# Patient Record
Sex: FEMALE | Race: ASIAN | NOT HISPANIC OR LATINO | URBAN - METROPOLITAN AREA
[De-identification: names, ages, dates, MRNs, and addresses within clinical notes are randomized per-mention and may not be internally consistent; named-entity substitution may affect disease eponyms.]

---

## 2017-10-04 ENCOUNTER — EMERGENCY (EMERGENCY)
Facility: HOSPITAL | Age: 32
LOS: 1 days | Discharge: HOME | End: 2017-10-04
Attending: EMERGENCY MEDICINE
Payer: MEDICAID

## 2017-10-04 VITALS
WEIGHT: 179.02 LBS | DIASTOLIC BLOOD PRESSURE: 76 MMHG | HEART RATE: 79 BPM | RESPIRATION RATE: 14 BRPM | SYSTOLIC BLOOD PRESSURE: 118 MMHG | HEIGHT: 66 IN | OXYGEN SATURATION: 100 % | TEMPERATURE: 97 F

## 2017-10-04 VITALS
DIASTOLIC BLOOD PRESSURE: 67 MMHG | OXYGEN SATURATION: 100 % | HEART RATE: 74 BPM | RESPIRATION RATE: 16 BRPM | TEMPERATURE: 98 F | SYSTOLIC BLOOD PRESSURE: 108 MMHG

## 2017-10-04 DIAGNOSIS — Z98.890 OTHER SPECIFIED POSTPROCEDURAL STATES: Chronic | ICD-10-CM

## 2017-10-04 LAB
ALBUMIN SERPL ELPH-MCNC: 3.8 G/DL — SIGNIFICANT CHANGE UP (ref 3.5–5)
ALP SERPL-CCNC: 73 U/L — SIGNIFICANT CHANGE UP (ref 40–120)
ALT FLD-CCNC: 18 U/L DA — SIGNIFICANT CHANGE UP (ref 10–60)
ANION GAP SERPL CALC-SCNC: 9 MMOL/L — SIGNIFICANT CHANGE UP (ref 5–17)
APPEARANCE UR: ABNORMAL
AST SERPL-CCNC: 12 U/L — SIGNIFICANT CHANGE UP (ref 10–40)
BASOPHILS # BLD AUTO: 0 K/UL — SIGNIFICANT CHANGE UP (ref 0–0.2)
BASOPHILS NFR BLD AUTO: 0.5 % — SIGNIFICANT CHANGE UP (ref 0–2)
BILIRUB SERPL-MCNC: 0.4 MG/DL — SIGNIFICANT CHANGE UP (ref 0.2–1.2)
BILIRUB UR-MCNC: NEGATIVE — SIGNIFICANT CHANGE UP
BUN SERPL-MCNC: 12 MG/DL — SIGNIFICANT CHANGE UP (ref 7–18)
CALCIUM SERPL-MCNC: 9 MG/DL — SIGNIFICANT CHANGE UP (ref 8.4–10.5)
CHLORIDE SERPL-SCNC: 105 MMOL/L — SIGNIFICANT CHANGE UP (ref 96–108)
CO2 SERPL-SCNC: 26 MMOL/L — SIGNIFICANT CHANGE UP (ref 22–31)
COLOR SPEC: YELLOW — SIGNIFICANT CHANGE UP
CREAT SERPL-MCNC: 0.78 MG/DL — SIGNIFICANT CHANGE UP (ref 0.5–1.3)
DIFF PNL FLD: ABNORMAL
EOSINOPHIL # BLD AUTO: 0.1 K/UL — SIGNIFICANT CHANGE UP (ref 0–0.5)
EOSINOPHIL NFR BLD AUTO: 0.5 % — SIGNIFICANT CHANGE UP (ref 0–6)
GLUCOSE SERPL-MCNC: 105 MG/DL — HIGH (ref 70–99)
GLUCOSE UR QL: NEGATIVE — SIGNIFICANT CHANGE UP
HCG SERPL-ACNC: <1 MIU/ML — SIGNIFICANT CHANGE UP
HCG UR QL: NEGATIVE — SIGNIFICANT CHANGE UP
HCT VFR BLD CALC: 38.8 % — SIGNIFICANT CHANGE UP (ref 34.5–45)
HGB BLD-MCNC: 11.9 G/DL — SIGNIFICANT CHANGE UP (ref 11.5–15.5)
KETONES UR-MCNC: ABNORMAL
LEUKOCYTE ESTERASE UR-ACNC: ABNORMAL
LIDOCAIN IGE QN: 135 U/L — SIGNIFICANT CHANGE UP (ref 73–393)
LYMPHOCYTES # BLD AUTO: 1.6 K/UL — SIGNIFICANT CHANGE UP (ref 1–3.3)
LYMPHOCYTES # BLD AUTO: 16.1 % — SIGNIFICANT CHANGE UP (ref 13–44)
MCHC RBC-ENTMCNC: 27.5 PG — SIGNIFICANT CHANGE UP (ref 27–34)
MCHC RBC-ENTMCNC: 30.6 GM/DL — LOW (ref 32–36)
MCV RBC AUTO: 89.9 FL — SIGNIFICANT CHANGE UP (ref 80–100)
MONOCYTES # BLD AUTO: 0.4 K/UL — SIGNIFICANT CHANGE UP (ref 0–0.9)
MONOCYTES NFR BLD AUTO: 3.9 % — SIGNIFICANT CHANGE UP (ref 2–14)
NEUTROPHILS # BLD AUTO: 8.1 K/UL — HIGH (ref 1.8–7.4)
NEUTROPHILS NFR BLD AUTO: 79 % — HIGH (ref 43–77)
NITRITE UR-MCNC: NEGATIVE — SIGNIFICANT CHANGE UP
PH UR: 6.5 — SIGNIFICANT CHANGE UP (ref 5–8)
PLATELET # BLD AUTO: 231 K/UL — SIGNIFICANT CHANGE UP (ref 150–400)
POTASSIUM SERPL-MCNC: 3.6 MMOL/L — SIGNIFICANT CHANGE UP (ref 3.5–5.3)
POTASSIUM SERPL-SCNC: 3.6 MMOL/L — SIGNIFICANT CHANGE UP (ref 3.5–5.3)
PROT SERPL-MCNC: 7.7 G/DL — SIGNIFICANT CHANGE UP (ref 6–8.3)
PROT UR-MCNC: 30 MG/DL
RBC # BLD: 4.31 M/UL — SIGNIFICANT CHANGE UP (ref 3.8–5.2)
RBC # FLD: 16 % — HIGH (ref 10.3–14.5)
SODIUM SERPL-SCNC: 140 MMOL/L — SIGNIFICANT CHANGE UP (ref 135–145)
SP GR SPEC: 1.02 — SIGNIFICANT CHANGE UP (ref 1.01–1.02)
UROBILINOGEN FLD QL: NEGATIVE — SIGNIFICANT CHANGE UP
WBC # BLD: 10.2 K/UL — SIGNIFICANT CHANGE UP (ref 3.8–10.5)
WBC # FLD AUTO: 10.2 K/UL — SIGNIFICANT CHANGE UP (ref 3.8–10.5)

## 2017-10-04 PROCEDURE — 99284 EMERGENCY DEPT VISIT MOD MDM: CPT | Mod: 25

## 2017-10-04 PROCEDURE — 81001 URINALYSIS AUTO W/SCOPE: CPT

## 2017-10-04 PROCEDURE — 83690 ASSAY OF LIPASE: CPT

## 2017-10-04 PROCEDURE — 74177 CT ABD & PELVIS W/CONTRAST: CPT

## 2017-10-04 PROCEDURE — 81025 URINE PREGNANCY TEST: CPT

## 2017-10-04 PROCEDURE — 80053 COMPREHEN METABOLIC PANEL: CPT

## 2017-10-04 PROCEDURE — 99285 EMERGENCY DEPT VISIT HI MDM: CPT

## 2017-10-04 PROCEDURE — 85027 COMPLETE CBC AUTOMATED: CPT

## 2017-10-04 PROCEDURE — 84702 CHORIONIC GONADOTROPIN TEST: CPT

## 2017-10-04 PROCEDURE — 74177 CT ABD & PELVIS W/CONTRAST: CPT | Mod: 26

## 2017-10-04 PROCEDURE — 96374 THER/PROPH/DIAG INJ IV PUSH: CPT | Mod: XU

## 2017-10-04 RX ORDER — MORPHINE SULFATE 50 MG/1
4 CAPSULE, EXTENDED RELEASE ORAL ONCE
Qty: 0 | Refills: 0 | Status: DISCONTINUED | OUTPATIENT
Start: 2017-10-04 | End: 2017-10-04

## 2017-10-04 RX ORDER — SODIUM CHLORIDE 9 MG/ML
1000 INJECTION INTRAMUSCULAR; INTRAVENOUS; SUBCUTANEOUS ONCE
Qty: 0 | Refills: 0 | Status: COMPLETED | OUTPATIENT
Start: 2017-10-04 | End: 2017-10-04

## 2017-10-04 RX ADMIN — MORPHINE SULFATE 4 MILLIGRAM(S): 50 CAPSULE, EXTENDED RELEASE ORAL at 13:02

## 2017-10-04 RX ADMIN — SODIUM CHLORIDE 1000 MILLILITER(S): 9 INJECTION INTRAMUSCULAR; INTRAVENOUS; SUBCUTANEOUS at 12:32

## 2017-10-04 RX ADMIN — MORPHINE SULFATE 4 MILLIGRAM(S): 50 CAPSULE, EXTENDED RELEASE ORAL at 12:32

## 2017-10-04 NOTE — ED PROVIDER NOTE - MEDICAL DECISION MAKING DETAILS
30 y/o F pt presents with RLQ abd pain. Plan to r/o appendicitis. Will do CT Abd/Pel, analgesia, and reassess.

## 2017-10-04 NOTE — ED PROVIDER NOTE - OBJECTIVE STATEMENT
32 y/o F pt with PMHx of R Ovarian Cyst (s/p cyst removal) and PSHx of Ovarian Cystectomy presents to ED c/o lower abd pain with associated nausea since today. Pt reports she is currently on her menstrual cycle. Pt denies vomiting, dysuria, hematuria, burning with urination, urinary frequency, or any other complaints. Pt also denies having had similar pain in the past. NKDA.

## 2017-10-07 DIAGNOSIS — R10.31 RIGHT LOWER QUADRANT PAIN: ICD-10-CM

## 2017-10-07 DIAGNOSIS — R11.0 NAUSEA: ICD-10-CM

## 2018-09-28 PROBLEM — Z00.00 ENCOUNTER FOR PREVENTIVE HEALTH EXAMINATION: Status: ACTIVE | Noted: 2018-09-28

## 2018-10-19 ENCOUNTER — APPOINTMENT (OUTPATIENT)
Dept: ANTEPARTUM | Facility: CLINIC | Age: 33
End: 2018-10-19
Payer: MEDICAID

## 2018-10-19 ENCOUNTER — ASOB RESULT (OUTPATIENT)
Age: 33
End: 2018-10-19

## 2018-10-19 PROCEDURE — 36416 COLLJ CAPILLARY BLOOD SPEC: CPT

## 2018-10-19 PROCEDURE — 76813 OB US NUCHAL MEAS 1 GEST: CPT

## 2018-10-19 PROCEDURE — 76801 OB US < 14 WKS SINGLE FETUS: CPT

## 2018-12-17 ENCOUNTER — APPOINTMENT (OUTPATIENT)
Dept: ANTEPARTUM | Facility: CLINIC | Age: 33
End: 2018-12-17

## 2019-03-09 ENCOUNTER — INPATIENT (INPATIENT)
Facility: HOSPITAL | Age: 34
LOS: 1 days | Discharge: HOME | End: 2019-03-11
Attending: OBSTETRICS & GYNECOLOGY | Admitting: OBSTETRICS & GYNECOLOGY

## 2019-03-09 VITALS — TEMPERATURE: 99 F

## 2019-03-09 DIAGNOSIS — Z98.890 OTHER SPECIFIED POSTPROCEDURAL STATES: Chronic | ICD-10-CM

## 2019-03-09 DIAGNOSIS — O26.899 OTHER SPECIFIED PREGNANCY RELATED CONDITIONS, UNSPECIFIED TRIMESTER: ICD-10-CM

## 2019-03-09 DIAGNOSIS — Z3A.00 WEEKS OF GESTATION OF PREGNANCY NOT SPECIFIED: ICD-10-CM

## 2019-03-09 LAB
ALBUMIN SERPL ELPH-MCNC: 3.7 G/DL — SIGNIFICANT CHANGE UP (ref 3.3–5)
ALP SERPL-CCNC: 124 U/L — HIGH (ref 40–120)
ALT FLD-CCNC: 15 U/L — SIGNIFICANT CHANGE UP (ref 4–33)
AMYLASE P1 CFR SERPL: 83 U/L — SIGNIFICANT CHANGE UP (ref 25–125)
ANION GAP SERPL CALC-SCNC: 11 MMO/L — SIGNIFICANT CHANGE UP (ref 7–14)
APPEARANCE UR: CLEAR — SIGNIFICANT CHANGE UP
AST SERPL-CCNC: 14 U/L — SIGNIFICANT CHANGE UP (ref 4–32)
B-OH-BUTYR SERPL-SCNC: < 0 MMOL/L — LOW (ref 0–0.4)
BACTERIA # UR AUTO: SIGNIFICANT CHANGE UP
BILIRUB SERPL-MCNC: < 0.2 MG/DL — LOW (ref 0.2–1.2)
BILIRUB UR-MCNC: NEGATIVE — SIGNIFICANT CHANGE UP
BLOOD UR QL VISUAL: NEGATIVE — SIGNIFICANT CHANGE UP
BUN SERPL-MCNC: 6 MG/DL — LOW (ref 7–23)
CALCIUM SERPL-MCNC: 9.7 MG/DL — SIGNIFICANT CHANGE UP (ref 8.4–10.5)
CHLORIDE SERPL-SCNC: 102 MMOL/L — SIGNIFICANT CHANGE UP (ref 98–107)
CO2 SERPL-SCNC: 21 MMOL/L — LOW (ref 22–31)
COLOR SPEC: SIGNIFICANT CHANGE UP
CREAT SERPL-MCNC: 0.49 MG/DL — LOW (ref 0.5–1.3)
FLECAINIDE SERPL-MCNC: POSITIVE — SIGNIFICANT CHANGE UP
GLUCOSE BLDC GLUCOMTR-MCNC: 218 MG/DL — HIGH (ref 70–99)
GLUCOSE BLDC GLUCOMTR-MCNC: 223 MG/DL — HIGH (ref 70–99)
GLUCOSE SERPL-MCNC: 241 MG/DL — HIGH (ref 70–99)
GLUCOSE UR-MCNC: >1000 — HIGH
HCT VFR BLD CALC: 37.2 % — SIGNIFICANT CHANGE UP (ref 34.5–45)
HGB BLD-MCNC: 11.6 G/DL — SIGNIFICANT CHANGE UP (ref 11.5–15.5)
HYALINE CASTS # UR AUTO: SIGNIFICANT CHANGE UP
KETONES UR-MCNC: NEGATIVE — SIGNIFICANT CHANGE UP
LEUKOCYTE ESTERASE UR-ACNC: SIGNIFICANT CHANGE UP
LIDOCAIN IGE QN: 39.4 U/L — SIGNIFICANT CHANGE UP (ref 7–60)
MCHC RBC-ENTMCNC: 28.8 PG — SIGNIFICANT CHANGE UP (ref 27–34)
MCHC RBC-ENTMCNC: 31.2 % — LOW (ref 32–36)
MCV RBC AUTO: 92.3 FL — SIGNIFICANT CHANGE UP (ref 80–100)
NITRITE UR-MCNC: NEGATIVE — SIGNIFICANT CHANGE UP
NRBC # FLD: 0 K/UL — LOW (ref 25–125)
PH UR: 6.5 — SIGNIFICANT CHANGE UP (ref 5–8)
PLATELET # BLD AUTO: 296 K/UL — SIGNIFICANT CHANGE UP (ref 150–400)
PMV BLD: 10.1 FL — SIGNIFICANT CHANGE UP (ref 7–13)
POTASSIUM SERPL-MCNC: 4 MMOL/L — SIGNIFICANT CHANGE UP (ref 3.5–5.3)
POTASSIUM SERPL-SCNC: 4 MMOL/L — SIGNIFICANT CHANGE UP (ref 3.5–5.3)
PROT SERPL-MCNC: 7.5 G/DL — SIGNIFICANT CHANGE UP (ref 6–8.3)
PROT UR-MCNC: 20 — SIGNIFICANT CHANGE UP
RBC # BLD: 4.03 M/UL — SIGNIFICANT CHANGE UP (ref 3.8–5.2)
RBC # FLD: 15.9 % — HIGH (ref 10.3–14.5)
RBC CASTS # UR COMP ASSIST: SIGNIFICANT CHANGE UP (ref 0–?)
SODIUM SERPL-SCNC: 134 MMOL/L — LOW (ref 135–145)
SP GR SPEC: 1.02 — SIGNIFICANT CHANGE UP (ref 1–1.04)
SQUAMOUS # UR AUTO: SIGNIFICANT CHANGE UP
UROBILINOGEN FLD QL: NORMAL — SIGNIFICANT CHANGE UP
WBC # BLD: 10.39 K/UL — SIGNIFICANT CHANGE UP (ref 3.8–10.5)
WBC # FLD AUTO: 10.39 K/UL — SIGNIFICANT CHANGE UP (ref 3.8–10.5)
WBC UR QL: HIGH (ref 0–?)

## 2019-03-09 RX ORDER — SODIUM CHLORIDE 9 MG/ML
3 INJECTION INTRAMUSCULAR; INTRAVENOUS; SUBCUTANEOUS EVERY 8 HOURS
Qty: 0 | Refills: 0 | Status: DISCONTINUED | OUTPATIENT
Start: 2019-03-09 | End: 2019-03-11

## 2019-03-09 RX ORDER — INSULIN LISPRO 100/ML
14 VIAL (ML) SUBCUTANEOUS
Qty: 0 | Refills: 0 | Status: DISCONTINUED | OUTPATIENT
Start: 2019-03-09 | End: 2019-03-10

## 2019-03-09 RX ORDER — INSULIN LISPRO 100/ML
10 VIAL (ML) SUBCUTANEOUS
Qty: 0 | Refills: 0 | Status: DISCONTINUED | OUTPATIENT
Start: 2019-03-09 | End: 2019-03-10

## 2019-03-09 RX ORDER — INSULIN LISPRO 100/ML
VIAL (ML) SUBCUTANEOUS
Qty: 0 | Refills: 0 | Status: DISCONTINUED | OUTPATIENT
Start: 2019-03-09 | End: 2019-03-10

## 2019-03-09 RX ORDER — DOCUSATE SODIUM 100 MG
100 CAPSULE ORAL THREE TIMES A DAY
Qty: 0 | Refills: 0 | Status: DISCONTINUED | OUTPATIENT
Start: 2019-03-09 | End: 2019-03-11

## 2019-03-09 RX ORDER — GLUCAGON INJECTION, SOLUTION 0.5 MG/.1ML
1 INJECTION, SOLUTION SUBCUTANEOUS ONCE
Qty: 0 | Refills: 0 | Status: DISCONTINUED | OUTPATIENT
Start: 2019-03-09 | End: 2019-03-11

## 2019-03-09 RX ORDER — NIFEDIPINE 30 MG
10 TABLET, EXTENDED RELEASE 24 HR ORAL
Qty: 0 | Refills: 0 | Status: COMPLETED | OUTPATIENT
Start: 2019-03-09 | End: 2019-03-10

## 2019-03-09 RX ORDER — FERROUS SULFATE 325(65) MG
325 TABLET ORAL DAILY
Qty: 0 | Refills: 0 | Status: DISCONTINUED | OUTPATIENT
Start: 2019-03-09 | End: 2019-03-11

## 2019-03-09 RX ORDER — FOLIC ACID 0.8 MG
1 TABLET ORAL DAILY
Qty: 0 | Refills: 0 | Status: DISCONTINUED | OUTPATIENT
Start: 2019-03-09 | End: 2019-03-11

## 2019-03-09 RX ORDER — DEXTROSE 50 % IN WATER 50 %
25 SYRINGE (ML) INTRAVENOUS ONCE
Qty: 0 | Refills: 0 | Status: DISCONTINUED | OUTPATIENT
Start: 2019-03-09 | End: 2019-03-11

## 2019-03-09 RX ORDER — SODIUM CHLORIDE 9 MG/ML
1000 INJECTION INTRAMUSCULAR; INTRAVENOUS; SUBCUTANEOUS
Qty: 0 | Refills: 0 | Status: DISCONTINUED | OUTPATIENT
Start: 2019-03-09 | End: 2019-03-09

## 2019-03-09 RX ORDER — DEXTROSE 50 % IN WATER 50 %
12.5 SYRINGE (ML) INTRAVENOUS ONCE
Qty: 0 | Refills: 0 | Status: DISCONTINUED | OUTPATIENT
Start: 2019-03-09 | End: 2019-03-11

## 2019-03-09 RX ORDER — INSULIN LISPRO 100/ML
VIAL (ML) SUBCUTANEOUS AT BEDTIME
Qty: 0 | Refills: 0 | Status: DISCONTINUED | OUTPATIENT
Start: 2019-03-09 | End: 2019-03-10

## 2019-03-09 RX ORDER — HUMAN INSULIN 100 [IU]/ML
34 INJECTION, SUSPENSION SUBCUTANEOUS AT BEDTIME
Qty: 0 | Refills: 0 | Status: DISCONTINUED | OUTPATIENT
Start: 2019-03-09 | End: 2019-03-10

## 2019-03-09 RX ORDER — SODIUM CHLORIDE 9 MG/ML
1000 INJECTION, SOLUTION INTRAVENOUS
Qty: 0 | Refills: 0 | Status: DISCONTINUED | OUTPATIENT
Start: 2019-03-09 | End: 2019-03-11

## 2019-03-09 RX ORDER — SODIUM CHLORIDE 9 MG/ML
1000 INJECTION INTRAMUSCULAR; INTRAVENOUS; SUBCUTANEOUS ONCE
Qty: 0 | Refills: 0 | Status: COMPLETED | OUTPATIENT
Start: 2019-03-09 | End: 2019-03-09

## 2019-03-09 RX ORDER — DIPHENHYDRAMINE HCL 50 MG
25 CAPSULE ORAL ONCE
Qty: 0 | Refills: 0 | Status: COMPLETED | OUTPATIENT
Start: 2019-03-09 | End: 2019-03-09

## 2019-03-09 RX ORDER — DEXTROSE 50 % IN WATER 50 %
15 SYRINGE (ML) INTRAVENOUS ONCE
Qty: 0 | Refills: 0 | Status: DISCONTINUED | OUTPATIENT
Start: 2019-03-09 | End: 2019-03-11

## 2019-03-09 RX ORDER — INSULIN LISPRO 100/ML
3 VIAL (ML) SUBCUTANEOUS ONCE
Qty: 0 | Refills: 0 | Status: COMPLETED | OUTPATIENT
Start: 2019-03-09 | End: 2019-03-09

## 2019-03-09 RX ORDER — DIPHENHYDRAMINE HCL 50 MG
25 CAPSULE ORAL ONCE
Qty: 0 | Refills: 0 | Status: DISCONTINUED | OUTPATIENT
Start: 2019-03-09 | End: 2019-03-09

## 2019-03-09 RX ADMIN — SODIUM CHLORIDE 2000 MILLILITER(S): 9 INJECTION INTRAMUSCULAR; INTRAVENOUS; SUBCUTANEOUS at 20:34

## 2019-03-09 RX ADMIN — SODIUM CHLORIDE 250 MILLILITER(S): 9 INJECTION INTRAMUSCULAR; INTRAVENOUS; SUBCUTANEOUS at 22:10

## 2019-03-09 RX ADMIN — Medication 3 UNIT(S): at 22:24

## 2019-03-09 RX ADMIN — Medication 25 MILLIGRAM(S): at 22:23

## 2019-03-10 VITALS
TEMPERATURE: 99 F | WEIGHT: 207.23 LBS | SYSTOLIC BLOOD PRESSURE: 87 MMHG | HEART RATE: 89 BPM | OXYGEN SATURATION: 97 % | RESPIRATION RATE: 16 BRPM | DIASTOLIC BLOOD PRESSURE: 83 MMHG | HEIGHT: 66 IN

## 2019-03-10 LAB
BLD GP AB SCN SERPL QL: NEGATIVE — SIGNIFICANT CHANGE UP
GLUCOSE BLDC GLUCOMTR-MCNC: 127 MG/DL — HIGH (ref 70–99)
GLUCOSE BLDC GLUCOMTR-MCNC: 135 MG/DL — HIGH (ref 70–99)
GLUCOSE BLDC GLUCOMTR-MCNC: 153 MG/DL — HIGH (ref 70–99)
GLUCOSE BLDC GLUCOMTR-MCNC: 154 MG/DL — HIGH (ref 70–99)
GLUCOSE BLDC GLUCOMTR-MCNC: 161 MG/DL — HIGH (ref 70–99)
GLUCOSE BLDC GLUCOMTR-MCNC: 180 MG/DL — HIGH (ref 70–99)
GLUCOSE BLDC GLUCOMTR-MCNC: 78 MG/DL — SIGNIFICANT CHANGE UP (ref 70–99)
GLUCOSE BLDC GLUCOMTR-MCNC: 90 MG/DL — SIGNIFICANT CHANGE UP (ref 70–99)
GLUCOSE BLDC GLUCOMTR-MCNC: 92 MG/DL — SIGNIFICANT CHANGE UP (ref 70–99)
HBV SURFACE AG SER-ACNC: NEGATIVE — SIGNIFICANT CHANGE UP
HIV 1+2 AB+HIV1 P24 AG SERPL QL IA: SIGNIFICANT CHANGE UP
RH IG SCN BLD-IMP: POSITIVE — SIGNIFICANT CHANGE UP
RH IG SCN BLD-IMP: POSITIVE — SIGNIFICANT CHANGE UP

## 2019-03-10 RX ORDER — INSULIN LISPRO 100/ML
3 VIAL (ML) SUBCUTANEOUS ONCE
Qty: 0 | Refills: 0 | Status: DISCONTINUED | OUTPATIENT
Start: 2019-03-10 | End: 2019-03-10

## 2019-03-10 RX ORDER — INSULIN HUMAN 100 [IU]/ML
34 INJECTION, SOLUTION SUBCUTANEOUS ONCE
Qty: 0 | Refills: 0 | Status: DISCONTINUED | OUTPATIENT
Start: 2019-03-10 | End: 2019-03-10

## 2019-03-10 RX ORDER — AMPICILLIN TRIHYDRATE 250 MG
1 CAPSULE ORAL EVERY 4 HOURS
Qty: 0 | Refills: 0 | Status: DISCONTINUED | OUTPATIENT
Start: 2019-03-10 | End: 2019-03-10

## 2019-03-10 RX ORDER — INSULIN LISPRO 100/ML
VIAL (ML) SUBCUTANEOUS AT BEDTIME
Qty: 0 | Refills: 0 | Status: DISCONTINUED | OUTPATIENT
Start: 2019-03-10 | End: 2019-03-11

## 2019-03-10 RX ORDER — AMPICILLIN TRIHYDRATE 250 MG
CAPSULE ORAL
Qty: 0 | Refills: 0 | Status: DISCONTINUED | OUTPATIENT
Start: 2019-03-10 | End: 2019-03-10

## 2019-03-10 RX ORDER — NIFEDIPINE 30 MG
10 TABLET, EXTENDED RELEASE 24 HR ORAL EVERY 6 HOURS
Qty: 0 | Refills: 0 | Status: DISCONTINUED | OUTPATIENT
Start: 2019-03-10 | End: 2019-03-11

## 2019-03-10 RX ORDER — AMPICILLIN TRIHYDRATE 250 MG
1 CAPSULE ORAL EVERY 6 HOURS
Qty: 0 | Refills: 0 | Status: DISCONTINUED | OUTPATIENT
Start: 2019-03-10 | End: 2019-03-10

## 2019-03-10 RX ORDER — INSULIN GLARGINE 100 [IU]/ML
34 INJECTION, SOLUTION SUBCUTANEOUS AT BEDTIME
Qty: 0 | Refills: 0 | Status: DISCONTINUED | OUTPATIENT
Start: 2019-03-10 | End: 2019-03-11

## 2019-03-10 RX ORDER — INSULIN LISPRO 100/ML
VIAL (ML) SUBCUTANEOUS
Qty: 0 | Refills: 0 | Status: DISCONTINUED | OUTPATIENT
Start: 2019-03-10 | End: 2019-03-11

## 2019-03-10 RX ORDER — AMPICILLIN TRIHYDRATE 250 MG
2 CAPSULE ORAL ONCE
Qty: 0 | Refills: 0 | Status: COMPLETED | OUTPATIENT
Start: 2019-03-10 | End: 2019-03-10

## 2019-03-10 RX ORDER — AMPICILLIN TRIHYDRATE 250 MG
1 CAPSULE ORAL EVERY 4 HOURS
Qty: 0 | Refills: 0 | Status: DISCONTINUED | OUTPATIENT
Start: 2019-03-10 | End: 2019-03-11

## 2019-03-10 RX ORDER — HUMAN INSULIN 100 [IU]/ML
34 INJECTION, SUSPENSION SUBCUTANEOUS ONCE
Qty: 0 | Refills: 0 | Status: DISCONTINUED | OUTPATIENT
Start: 2019-03-10 | End: 2019-03-10

## 2019-03-10 RX ORDER — INSULIN LISPRO 100/ML
12 VIAL (ML) SUBCUTANEOUS
Qty: 0 | Refills: 0 | Status: DISCONTINUED | OUTPATIENT
Start: 2019-03-10 | End: 2019-03-11

## 2019-03-10 RX ORDER — INSULIN LISPRO 100/ML
17 VIAL (ML) SUBCUTANEOUS
Qty: 0 | Refills: 0 | Status: DISCONTINUED | OUTPATIENT
Start: 2019-03-10 | End: 2019-03-11

## 2019-03-10 RX ORDER — PROGESTERONE 200 MG/1
100 CAPSULE, LIQUID FILLED ORAL AT BEDTIME
Qty: 0 | Refills: 0 | Status: DISCONTINUED | OUTPATIENT
Start: 2019-03-10 | End: 2019-03-11

## 2019-03-10 RX ORDER — INSULIN GLARGINE 100 [IU]/ML
34 INJECTION, SOLUTION SUBCUTANEOUS ONCE
Qty: 0 | Refills: 0 | Status: COMPLETED | OUTPATIENT
Start: 2019-03-10 | End: 2019-03-10

## 2019-03-10 RX ORDER — INSULIN LISPRO 100/ML
2 VIAL (ML) SUBCUTANEOUS ONCE
Qty: 0 | Refills: 0 | Status: COMPLETED | OUTPATIENT
Start: 2019-03-10 | End: 2019-03-10

## 2019-03-10 RX ORDER — DIPHENHYDRAMINE HCL 50 MG
25 CAPSULE ORAL ONCE
Qty: 0 | Refills: 0 | Status: DISCONTINUED | OUTPATIENT
Start: 2019-03-10 | End: 2019-03-11

## 2019-03-10 RX ORDER — LORATADINE 10 MG/1
10 TABLET ORAL DAILY
Qty: 0 | Refills: 0 | Status: DISCONTINUED | OUTPATIENT
Start: 2019-03-10 | End: 2019-03-11

## 2019-03-10 RX ADMIN — Medication 2 UNIT(S): at 12:00

## 2019-03-10 RX ADMIN — SODIUM CHLORIDE 3 MILLILITER(S): 9 INJECTION INTRAMUSCULAR; INTRAVENOUS; SUBCUTANEOUS at 20:00

## 2019-03-10 RX ADMIN — Medication 10 MILLIGRAM(S): at 22:23

## 2019-03-10 RX ADMIN — Medication 1 TABLET(S): at 10:08

## 2019-03-10 RX ADMIN — LORATADINE 10 MILLIGRAM(S): 10 TABLET ORAL at 09:39

## 2019-03-10 RX ADMIN — Medication 216 GRAM(S): at 01:30

## 2019-03-10 RX ADMIN — Medication 1 MILLIGRAM(S): at 10:08

## 2019-03-10 RX ADMIN — Medication 12 UNIT(S): at 16:38

## 2019-03-10 RX ADMIN — Medication 10 UNIT(S): at 10:09

## 2019-03-10 RX ADMIN — Medication 208 GRAM(S): at 10:08

## 2019-03-10 RX ADMIN — Medication 10 MILLIGRAM(S): at 16:14

## 2019-03-10 RX ADMIN — Medication 208 GRAM(S): at 14:28

## 2019-03-10 RX ADMIN — Medication 208 GRAM(S): at 05:31

## 2019-03-10 RX ADMIN — Medication 10 MILLIGRAM(S): at 10:09

## 2019-03-10 RX ADMIN — Medication 17 UNIT(S): at 21:10

## 2019-03-10 RX ADMIN — INSULIN GLARGINE 34 UNIT(S): 100 INJECTION, SOLUTION SUBCUTANEOUS at 03:36

## 2019-03-10 RX ADMIN — Medication 12 MILLIGRAM(S): at 00:52

## 2019-03-10 RX ADMIN — Medication 108 GRAM(S): at 22:26

## 2019-03-10 RX ADMIN — Medication 10 MILLIGRAM(S): at 00:57

## 2019-03-10 RX ADMIN — Medication 208 GRAM(S): at 18:20

## 2019-03-10 RX ADMIN — PROGESTERONE 100 MILLIGRAM(S): 200 CAPSULE, LIQUID FILLED ORAL at 22:14

## 2019-03-10 RX ADMIN — Medication 325 MILLIGRAM(S): at 10:08

## 2019-03-10 RX ADMIN — Medication 10 MILLIGRAM(S): at 00:36

## 2019-03-10 RX ADMIN — Medication 10 MILLIGRAM(S): at 00:09

## 2019-03-10 RX ADMIN — SODIUM CHLORIDE 3 MILLILITER(S): 9 INJECTION INTRAMUSCULAR; INTRAVENOUS; SUBCUTANEOUS at 14:29

## 2019-03-11 ENCOUNTER — ASOB RESULT (OUTPATIENT)
Age: 34
End: 2019-03-11

## 2019-03-11 ENCOUNTER — OUTPATIENT (OUTPATIENT)
Dept: OUTPATIENT SERVICES | Facility: HOSPITAL | Age: 34
LOS: 1 days | End: 2019-03-11

## 2019-03-11 ENCOUNTER — APPOINTMENT (OUTPATIENT)
Dept: ANTEPARTUM | Facility: HOSPITAL | Age: 34
End: 2019-03-11
Payer: MEDICAID

## 2019-03-11 ENCOUNTER — TRANSCRIPTION ENCOUNTER (OUTPATIENT)
Age: 34
End: 2019-03-11

## 2019-03-11 VITALS
OXYGEN SATURATION: 98 % | SYSTOLIC BLOOD PRESSURE: 102 MMHG | TEMPERATURE: 98 F | RESPIRATION RATE: 18 BRPM | DIASTOLIC BLOOD PRESSURE: 56 MMHG | HEART RATE: 89 BPM

## 2019-03-11 DIAGNOSIS — Z98.890 OTHER SPECIFIED POSTPROCEDURAL STATES: Chronic | ICD-10-CM

## 2019-03-11 LAB
GLUCOSE BLDC GLUCOMTR-MCNC: 116 MG/DL — HIGH (ref 70–99)
GLUCOSE BLDC GLUCOMTR-MCNC: 138 MG/DL — HIGH (ref 70–99)
GLUCOSE BLDC GLUCOMTR-MCNC: 144 MG/DL — HIGH (ref 70–99)
GLUCOSE BLDC GLUCOMTR-MCNC: 162 MG/DL — HIGH (ref 70–99)
GLUCOSE BLDC GLUCOMTR-MCNC: 220 MG/DL — HIGH (ref 70–99)
RUBV IGG SER-ACNC: 6.4 INDEX — SIGNIFICANT CHANGE UP
RUBV IGG SER-IMP: POSITIVE — SIGNIFICANT CHANGE UP
T PALLIDUM AB TITR SER: NEGATIVE — SIGNIFICANT CHANGE UP

## 2019-03-11 PROCEDURE — 76819 FETAL BIOPHYS PROFIL W/O NST: CPT | Mod: 26

## 2019-03-11 PROCEDURE — 76811 OB US DETAILED SNGL FETUS: CPT | Mod: 26

## 2019-03-11 RX ORDER — INSULIN GLARGINE 100 [IU]/ML
34 INJECTION, SOLUTION SUBCUTANEOUS
Qty: 0 | Refills: 0 | COMMUNITY

## 2019-03-11 RX ORDER — INSULIN LISPRO 100/ML
0 VIAL (ML) SUBCUTANEOUS
Qty: 0 | Refills: 0 | COMMUNITY

## 2019-03-11 RX ORDER — INSULIN LISPRO 100/ML
2 VIAL (ML) SUBCUTANEOUS ONCE
Qty: 0 | Refills: 0 | Status: COMPLETED | OUTPATIENT
Start: 2019-03-11 | End: 2019-03-11

## 2019-03-11 RX ORDER — INSULIN GLARGINE 100 [IU]/ML
40 INJECTION, SOLUTION SUBCUTANEOUS
Qty: 0 | Refills: 0 | COMMUNITY
Start: 2019-03-11

## 2019-03-11 RX ORDER — PROGESTERONE 200 MG/1
1 CAPSULE, LIQUID FILLED ORAL
Qty: 0 | Refills: 0 | COMMUNITY

## 2019-03-11 RX ORDER — PROGESTERONE 200 MG/1
100 CAPSULE, LIQUID FILLED ORAL
Qty: 0 | Refills: 0 | COMMUNITY
Start: 2019-03-11

## 2019-03-11 RX ORDER — INSULIN GLARGINE 100 [IU]/ML
40 INJECTION, SOLUTION SUBCUTANEOUS AT BEDTIME
Qty: 0 | Refills: 0 | Status: DISCONTINUED | OUTPATIENT
Start: 2019-03-11 | End: 2019-03-11

## 2019-03-11 RX ORDER — INSULIN LISPRO 100/ML
17 VIAL (ML) SUBCUTANEOUS
Qty: 0 | Refills: 0 | COMMUNITY

## 2019-03-11 RX ORDER — HEPARIN SODIUM 5000 [USP'U]/ML
5000 INJECTION INTRAVENOUS; SUBCUTANEOUS EVERY 12 HOURS
Qty: 0 | Refills: 0 | Status: DISCONTINUED | OUTPATIENT
Start: 2019-03-11 | End: 2019-03-11

## 2019-03-11 RX ADMIN — INSULIN GLARGINE 34 UNIT(S): 100 INJECTION, SOLUTION SUBCUTANEOUS at 00:19

## 2019-03-11 RX ADMIN — Medication 2 UNIT(S): at 10:28

## 2019-03-11 RX ADMIN — LORATADINE 10 MILLIGRAM(S): 10 TABLET ORAL at 11:45

## 2019-03-11 RX ADMIN — Medication 12 UNIT(S): at 08:19

## 2019-03-11 RX ADMIN — Medication 12 UNIT(S): at 12:40

## 2019-03-11 RX ADMIN — Medication 1 MILLIGRAM(S): at 11:45

## 2019-03-11 RX ADMIN — Medication 12 MILLIGRAM(S): at 00:57

## 2019-03-11 RX ADMIN — Medication 1: at 08:25

## 2019-03-11 RX ADMIN — Medication 325 MILLIGRAM(S): at 11:45

## 2019-03-11 RX ADMIN — SODIUM CHLORIDE 3 MILLILITER(S): 9 INJECTION INTRAMUSCULAR; INTRAVENOUS; SUBCUTANEOUS at 06:40

## 2019-03-11 RX ADMIN — Medication 1 TABLET(S): at 11:45

## 2019-03-11 NOTE — ADVANCED PRACTICE NURSE CONSULT - REASON FOR CONSULT
34y/o AMANDA 19 EGA 33.2wks . Pt. a/w back pain and cramping, with cervical length of 1.0-1.8cm. Pt stable, vital signs stable. Pt now without complaints, denies pain, contractions, vaginal bleeding, leaking fluid. Endorses +FM.  Fasting glucose this , will increased lantus dose to 40 units. S/P consult with Diabetic Nurse specialist, patient understands proper use and administration of insulin and blood glucose monitoring.   Pt referred for diabetes education because she has GDM on insulin and having hyperglycemia.

## 2019-03-11 NOTE — DISCHARGE NOTE ANTEPARTUM - CARE PROVIDER_API CALL
Fermin Estrada)  MaternalFetal Medicine; Obstetrics and Gynecology  [address] [address] [place] [address], Room [address]  [place] [address]  Phone: [phone]  Fax: [phone]  Follow Up Time:

## 2019-03-11 NOTE — DISCHARGE NOTE ANTEPARTUM - PATIENT PORTAL LINK FT
You can access the [facility] Patient Portal, offered by [facility], by registering with the following website: [email or web address]

## 2019-03-11 NOTE — DISCHARGE NOTE ANTEPARTUM - HOSPITAL COURSE
32y/o AMANDA 19 EGA 33.2wks . Pt. a/w back pain and cramping, with cervical length of 1.0-1.8cm. Pt now without complaints, denies pain, contractions, vaginal bleeding, leaking fluid. Endorses +FM.  Fasting glucose this , will increased lantus dose to 40 units.     AP: Cervical insufficiency, GDMA2 32y/o AMANDA 19 EGA 33.2wks . Pt. a/w back pain and cramping, with cervical length of 1.0-1.8cm. Pt stable, vital signs stable. Pt now without complaints, denies pain, contractions, vaginal bleeding, leaking fluid. Endorses +FM.  Fasting glucose this , will increased lantus dose to 40 units. S/P consult with Diabetic Nurse specialist, patient understands proper use and administration of insulin and blood glucose monitoring.  Pt to f/u in office this week.     AP: Cervical insufficiency, GDMA2

## 2019-03-11 NOTE — DISCHARGE NOTE ANTEPARTUM - MEDICATION SUMMARY - MEDICATIONS TO TAKE
I will START or STAY ON the medications listed below when I get home from the hospital:  None I will START or STAY ON the medications listed below when I get home from the hospital:    insulin glargine  -- 40 unit(s) subcutaneous once a day (at bedtime)  -- Indication: For Other pregnancy-related conditions, antepartum I will START or STAY ON the medications listed below when I get home from the hospital:    insulin glargine  -- 40 unit(s) subcutaneous once a day (at bedtime)  -- Indication: For Other pregnancy-related conditions, antepartum    HumaLOG 100 units/mL subcutaneous solution  -- 12 unit(s) subcutaneous once a day (before breakfast)  -- Indication: For Other pregnancy-related conditions, antepartum    HumaLOG  -- 12 unit(s) subcutaneous once a day before lunch  -- Indication: For Other pregnancy-related conditions, antepartum    HumaLOG 100 units/mL subcutaneous solution  -- subcutaneous once a day before dinner  -- Indication: For Other pregnancy-related conditions, antepartum    progesterone 100 mg vaginal insert  -- 100  vaginally once a day (at bedtime)  -- Indication: For Other pregnancy-related conditions, antepartum I will START or STAY ON the medications listed below when I get home from the hospital:    insulin glargine  -- 40 unit(s) subcutaneous once a day (at bedtime)  -- Indication: For Other pregnancy-related conditions, antepartum    HumaLOG 100 units/mL subcutaneous solution  -- 12 unit(s) subcutaneous once a day (before breakfast)  -- Indication: For Other pregnancy-related conditions, antepartum    HumaLOG  -- 12 unit(s) subcutaneous once a day before lunch  -- Indication: For Other pregnancy-related conditions, antepartum    HumaLOG 100 units/mL subcutaneous solution  -- 17 unit(s) subcutaneous once a day  -- Indication: For diabetes    progesterone 100 mg vaginal insert  -- 100  vaginally once a day (at bedtime)  -- Indication: For Other pregnancy-related conditions, antepartum

## 2019-03-11 NOTE — DISCHARGE NOTE ANTEPARTUM - CARE PLAN
Principal Discharge DX:	 labor without delivery, third trimester  Goal:	continue with prenatal care  Assessment and plan of treatment:	- continue blood glucose monitoring  -continue ADA diet  -continue insulin regimen for glucose control, increase lantus dose to 40 units at bedtime  -continue vaginal progesterone  -follow up with Dr. Estrada in office this week

## 2019-03-11 NOTE — ADVANCED PRACTICE NURSE CONSULT - RECOMMEDATIONS
Plan is to d/c pt today. Pt to f/u with MD this week. Pt was instructed to call with any problems or questions.

## 2019-03-11 NOTE — ADVANCED PRACTICE NURSE CONSULT - ASSESSMENT
Pt stopped taking her insulin for 2 days because she was becoming very itchy and developing redness on arms and legs. Patient was also starting to complain of increased cervical pressure a/w back pain and cramping. Patient was given steroids while in the hospital. Last dose of steroids was this am. Pt was instructed on the effect steroids has on blood sugars. Patient was instructed on complications that can happen if blood sugars are not controlled. Patient was instructed on diet in OB office and was able to teach back some information. Pt admitted that she did not follow diet because she was hungry. Pt instructed on GDM diet and proper snack suggestions that are culturally appropriate. Patient was able to teach back the correct way to check her blood sugar. When pt was demonstrating the correct way to use an insulin pen, it was noted that she did not perform the air shot prior to each injection, pt was giving her insulin injections mid to lower thigh, and pt was not counting for 10 seconds after insulin injection was given. Pt was instructed on proper use of insulin pen and location of injection sites and importance of rotating sites. Pt demonstrated understanding of information taught. Pt stopped taking her insulin for 2 days because she was becoming very itchy and developing redness on arms and legs. Patient was also starting to complain of increased cervical pressure a/w back pain and cramping. Patient was given steroids while in the hospital. Last dose of steroids was this am. Pt was instructed on the effect steroids has on blood sugars. Patient was instructed on complications that can happen if blood sugars are not controlled. Pt has been taking benadryl and and Claritin here and has not had any c/o itching/redness on her extremities Patient was instructed on diet in OB office and was able to teach back some information. Pt admitted that she did not follow diet because she was hungry. Pt instructed on GDM diet and proper snack suggestions that are culturally appropriate. Patient was able to teach back the correct way to check her blood sugar. When pt was demonstrating the correct way to use an insulin pen, it was noted that she did not perform the air shot prior to each injection, pt was giving her insulin injections mid to lower thigh, and pt was not counting for 10 seconds after insulin injection was given. Pt was instructed on proper use of insulin pen and location of injection sites and importance of rotating sites. Pt demonstrated understanding of information taught.

## 2019-03-11 NOTE — DISCHARGE NOTE ANTEPARTUM - PLAN OF CARE
continue with prenatal care - continue blood glucose monitoring  -continue ADA diet  -continue insulin regimen for glucose control, increase lantus dose to 40 units at bedtime  -continue vaginal progesterone  -follow up with Dr. Estrada in office this week - continue blood glucose monitoring  -continue ADA diet  -continue insulin regimen for glucose control, increase Lantus dose to 40 units at bedtime  -continue vaginal progesterone  -follow up with Dr. Estrada in office this week

## 2019-03-11 NOTE — DISCHARGE NOTE ANTEPARTUM - MEDICATION SUMMARY - MEDICATIONS TO CHANGE
I will SWITCH the dose or number of times a day I take the medications listed below when I get home from the hospital:  None I will SWITCH the dose or number of times a day I take the medications listed below when I get home from the hospital:    Lantus 100 units/mL subcutaneous solution  -- 34 unit(s) subcutaneous once a day (at bedtime)

## 2019-03-20 DIAGNOSIS — O60.03 PRETERM LABOR WITHOUT DELIVERY, THIRD TRIMESTER: ICD-10-CM

## 2019-03-20 DIAGNOSIS — O26.873 CERVICAL SHORTENING, THIRD TRIMESTER: ICD-10-CM

## 2019-03-20 DIAGNOSIS — O24.415 GESTATIONAL DIABETES MELLITUS IN PREGNANCY, CONTROLLED BY ORAL HYPOGLYCEMIC DRUGS: ICD-10-CM

## 2019-03-20 DIAGNOSIS — Z3A.33 33 WEEKS GESTATION OF PREGNANCY: ICD-10-CM

## 2019-03-28 ENCOUNTER — OUTPATIENT (OUTPATIENT)
Dept: INPATIENT UNIT | Facility: HOSPITAL | Age: 34
LOS: 1 days | Discharge: HOME | End: 2019-03-28
Payer: MEDICAID

## 2019-03-28 VITALS — OXYGEN SATURATION: 99 % | HEART RATE: 89 BPM

## 2019-03-28 DIAGNOSIS — O26.899 OTHER SPECIFIED PREGNANCY RELATED CONDITIONS, UNSPECIFIED TRIMESTER: ICD-10-CM

## 2019-03-28 DIAGNOSIS — Z3A.00 WEEKS OF GESTATION OF PREGNANCY NOT SPECIFIED: ICD-10-CM

## 2019-03-28 DIAGNOSIS — Z98.890 OTHER SPECIFIED POSTPROCEDURAL STATES: Chronic | ICD-10-CM

## 2019-03-28 LAB
ALBUMIN SERPL ELPH-MCNC: 3.2 G/DL — LOW (ref 3.3–5)
ALP SERPL-CCNC: 133 U/L — HIGH (ref 40–120)
ALT FLD-CCNC: 28 U/L — SIGNIFICANT CHANGE UP (ref 4–33)
AMYLASE P1 CFR SERPL: 104 U/L — SIGNIFICANT CHANGE UP (ref 25–125)
ANION GAP SERPL CALC-SCNC: 10 MMO/L — SIGNIFICANT CHANGE UP (ref 7–14)
APPEARANCE UR: SIGNIFICANT CHANGE UP
AST SERPL-CCNC: 25 U/L — SIGNIFICANT CHANGE UP (ref 4–32)
BACTERIA # UR AUTO: HIGH
BASOPHILS # BLD AUTO: 0.02 K/UL — SIGNIFICANT CHANGE UP (ref 0–0.2)
BASOPHILS NFR BLD AUTO: 0.3 % — SIGNIFICANT CHANGE UP (ref 0–2)
BILIRUB SERPL-MCNC: < 0.2 MG/DL — LOW (ref 0.2–1.2)
BILIRUB UR-MCNC: NEGATIVE — SIGNIFICANT CHANGE UP
BLOOD UR QL VISUAL: NEGATIVE — SIGNIFICANT CHANGE UP
BUN SERPL-MCNC: 7 MG/DL — SIGNIFICANT CHANGE UP (ref 7–23)
CALCIUM SERPL-MCNC: 8.9 MG/DL — SIGNIFICANT CHANGE UP (ref 8.4–10.5)
CHLORIDE SERPL-SCNC: 105 MMOL/L — SIGNIFICANT CHANGE UP (ref 98–107)
CO2 SERPL-SCNC: 21 MMOL/L — LOW (ref 22–31)
COLOR SPEC: COLORLESS — SIGNIFICANT CHANGE UP
CREAT SERPL-MCNC: 0.49 MG/DL — LOW (ref 0.5–1.3)
EOSINOPHIL # BLD AUTO: 0.1 K/UL — SIGNIFICANT CHANGE UP (ref 0–0.5)
EOSINOPHIL NFR BLD AUTO: 1.4 % — SIGNIFICANT CHANGE UP (ref 0–6)
GLUCOSE SERPL-MCNC: 137 MG/DL — HIGH (ref 70–99)
GLUCOSE UR-MCNC: 150 — HIGH
HCT VFR BLD CALC: 38.7 % — SIGNIFICANT CHANGE UP (ref 34.5–45)
HGB BLD-MCNC: 11.5 G/DL — SIGNIFICANT CHANGE UP (ref 11.5–15.5)
HYALINE CASTS # UR AUTO: SIGNIFICANT CHANGE UP
IMM GRANULOCYTES NFR BLD AUTO: 0.4 % — SIGNIFICANT CHANGE UP (ref 0–1.5)
KETONES UR-MCNC: NEGATIVE — SIGNIFICANT CHANGE UP
LEUKOCYTE ESTERASE UR-ACNC: SIGNIFICANT CHANGE UP
LIDOCAIN IGE QN: 43.5 U/L — SIGNIFICANT CHANGE UP (ref 7–60)
LYMPHOCYTES # BLD AUTO: 1.45 K/UL — SIGNIFICANT CHANGE UP (ref 1–3.3)
LYMPHOCYTES # BLD AUTO: 19.9 % — SIGNIFICANT CHANGE UP (ref 13–44)
MCHC RBC-ENTMCNC: 28.3 PG — SIGNIFICANT CHANGE UP (ref 27–34)
MCHC RBC-ENTMCNC: 29.7 % — LOW (ref 32–36)
MCV RBC AUTO: 95.3 FL — SIGNIFICANT CHANGE UP (ref 80–100)
MONOCYTES # BLD AUTO: 0.63 K/UL — SIGNIFICANT CHANGE UP (ref 0–0.9)
MONOCYTES NFR BLD AUTO: 8.6 % — SIGNIFICANT CHANGE UP (ref 2–14)
NEUTROPHILS # BLD AUTO: 5.07 K/UL — SIGNIFICANT CHANGE UP (ref 1.8–7.4)
NEUTROPHILS NFR BLD AUTO: 69.4 % — SIGNIFICANT CHANGE UP (ref 43–77)
NITRITE UR-MCNC: NEGATIVE — SIGNIFICANT CHANGE UP
NRBC # FLD: 0 K/UL — SIGNIFICANT CHANGE UP (ref 0–0)
PH UR: 7 — SIGNIFICANT CHANGE UP (ref 5–8)
PLATELET # BLD AUTO: 235 K/UL — SIGNIFICANT CHANGE UP (ref 150–400)
PMV BLD: 9.8 FL — SIGNIFICANT CHANGE UP (ref 7–13)
POTASSIUM SERPL-MCNC: 4.3 MMOL/L — SIGNIFICANT CHANGE UP (ref 3.5–5.3)
POTASSIUM SERPL-SCNC: 4.3 MMOL/L — SIGNIFICANT CHANGE UP (ref 3.5–5.3)
PROT SERPL-MCNC: 6.7 G/DL — SIGNIFICANT CHANGE UP (ref 6–8.3)
PROT UR-MCNC: NEGATIVE — SIGNIFICANT CHANGE UP
RBC # BLD: 4.06 M/UL — SIGNIFICANT CHANGE UP (ref 3.8–5.2)
RBC # FLD: 15.2 % — HIGH (ref 10.3–14.5)
RBC CASTS # UR COMP ASSIST: SIGNIFICANT CHANGE UP (ref 0–?)
SODIUM SERPL-SCNC: 136 MMOL/L — SIGNIFICANT CHANGE UP (ref 135–145)
SP GR SPEC: 1.01 — SIGNIFICANT CHANGE UP (ref 1–1.04)
SQUAMOUS # UR AUTO: SIGNIFICANT CHANGE UP
UROBILINOGEN FLD QL: NORMAL — SIGNIFICANT CHANGE UP
WBC # BLD: 7.3 K/UL — SIGNIFICANT CHANGE UP (ref 3.8–10.5)
WBC # FLD AUTO: 7.3 K/UL — SIGNIFICANT CHANGE UP (ref 3.8–10.5)
WBC UR QL: >50 — HIGH (ref 0–?)

## 2019-03-28 PROCEDURE — 76818 FETAL BIOPHYS PROFILE W/NST: CPT | Mod: 26

## 2019-03-28 PROCEDURE — 59025 FETAL NON-STRESS TEST: CPT | Mod: 26

## 2019-03-28 PROCEDURE — 99214 OFFICE O/P EST MOD 30 MIN: CPT | Mod: 25

## 2019-03-28 RX ORDER — SODIUM CHLORIDE 9 MG/ML
3 INJECTION INTRAMUSCULAR; INTRAVENOUS; SUBCUTANEOUS EVERY 8 HOURS
Qty: 0 | Refills: 0 | Status: DISCONTINUED | OUTPATIENT
Start: 2019-03-28 | End: 2019-04-12

## 2019-03-28 RX ORDER — DIPHENHYDRAMINE HCL 50 MG
50 CAPSULE ORAL ONCE
Qty: 0 | Refills: 0 | Status: COMPLETED | OUTPATIENT
Start: 2019-03-28 | End: 2019-03-28

## 2019-03-28 RX ORDER — URSODIOL 250 MG/1
1 TABLET, FILM COATED ORAL
Qty: 60 | Refills: 0 | OUTPATIENT
Start: 2019-03-28 | End: 2019-04-26

## 2019-03-28 RX ORDER — SODIUM CHLORIDE 9 MG/ML
500 INJECTION, SOLUTION INTRAVENOUS ONCE
Qty: 0 | Refills: 0 | Status: COMPLETED | OUTPATIENT
Start: 2019-03-28 | End: 2019-03-28

## 2019-03-28 RX ORDER — ACETAMINOPHEN 500 MG
975 TABLET ORAL ONCE
Qty: 0 | Refills: 0 | Status: COMPLETED | OUTPATIENT
Start: 2019-03-28 | End: 2019-03-28

## 2019-03-28 RX ADMIN — Medication 50 MILLIGRAM(S): at 05:14

## 2019-03-28 RX ADMIN — SODIUM CHLORIDE 1500 MILLILITER(S): 9 INJECTION, SOLUTION INTRAVENOUS at 05:33

## 2019-03-28 RX ADMIN — Medication 975 MILLIGRAM(S): at 05:33

## 2019-03-28 NOTE — OB PROVIDER TRIAGE NOTE - NSHPPHYSICALEXAM_GEN_ALL_CORE
abdomen soft, nontender  sse: negative pooling/nitrazine/ferning  sve: 0/0/-3/I  taus: sliup, cephalic presentation, anterior placenta, bpp 8/8, liseth 9.7  nst in progress

## 2019-03-28 NOTE — OB PROVIDER TRIAGE NOTE - ADDITIONAL INSTRUCTIONS
Please take your medication as prescribed: ursodiol 300 mg by mouth twice a day. Please drink 1-2 liters of fluid per day. Please go to your next scheduled prenatal appointment.     You may take benadryl 50 mg by mouth as needed

## 2019-03-28 NOTE — OB RN TRIAGE NOTE - CHIEF COMPLAINT QUOTE
"I think I broke my water at 330AM" and I am having constant cramping in my abdomen "I think I broke my water at 330AM" and I am having constant cramping in my abdomen. I also having itching on and off for 2-3 weeks. "I think I broke my water at 330AM/clear" and I am having constant cramping in my abdomen. I also having itching on and off for 2-3 weeks.

## 2019-03-28 NOTE — OB PROVIDER TRIAGE NOTE - NSOBPROVIDERNOTE_OBGYN_ALL_OB_FT
33 y.o.  AMANDA 19 @ 35.4 weeks presents with c/o constant abdominal pain, LOF since 330a and generalized itching x 3 weeks. Pt denies VB. States +FM. Antepartum course complicated by GDMA2-insulin.    allergies:  NKDA  medications:  prenatal vitamins  ademalog 17 units AC  basaglar 48 units HS - last dose 11pm 3/27/19    vs     abdomen soft, nontender  sse: negative pooling/nitrazine/ferning  sve: 0/0/-3/I  taus: sliup, cephalic presentation, anterior placenta, bpp 8/8, liseth 9.7  nst in progress 33 y.o.  AMANDA 19 @ 35.4 weeks presents with c/o constant abdominal pain, LOF since 330a and generalized itching x 3 weeks. Pt denies VB. States +FM. Antepartum course complicated by GDMA2-insulin.    allergies:  NKDA  medications:  prenatal vitamins  ademalog 17 units AC  basaglar 48 units HS - last dose 11pm 3/27/19    vs 101/63 HR      abdomen soft, nontender  sse: negative pooling/nitrazine/ferning  sve: 0/0/-3/I  taus: sliup, cephalic presentation, anterior placenta, bpp 8/8, liseth 9.7  nst in progress 33 y.o.  AMANDA 19 @ 35.4 weeks presents with c/o constant abdominal pain, LOF since 330a and generalized itching x 3 weeks. Pt denies VB. States +FM. Antepartum course complicated by GDMA2-insulin.    allergies:  NKDA  medications:  prenatal vitamins  ademalog 17 units AC  basaglar 48 units HS - last dose 11pm 3/27/19    vs 101/63 HR 86     abdomen soft, nontender  sse: negative pooling/nitrazine/ferning  sve: 0/0/-3/I  taus: sliup, cephalic presentation, anterior placenta, bpp 8/8, liseth 9.7  nst in progress    ua, cbc, cmp, bile acids, amylase/lipase pending   mL IVPB x 1  benadryl 50 mg IVP x 1 33 y.o.  AMANDA 19 @ 35.4 weeks presents with c/o constant abdominal pain, LOF since 330a and generalized itching x 3 weeks. Pt denies VB. States +FM. Antepartum course complicated by GDMA2-insulin.    allergies:  NKDA  medications:  prenatal vitamins  ademalog 17 units AC  basaglar 48 units HS - last dose 11pm 3/27/19    vs 101/63 HR 86     abdomen soft, nontender  sse: negative pooling/nitrazine/ferning  sve: 0/0/-3/I  taus: sliup, cephalic presentation, anterior placenta, bpp 8/8, liseth 9.7  nst in progress    ua, cbc, cmp, bile acids, amylase/lipase pending   mL IVPB x 1  benadryl 50 mg IVP x 1  tylenol 975 mg PO x 1 33 y.o.  AMANDA 19 @ 35.4 weeks presents with c/o constant abdominal pain, LOF since 330a and generalized itching x 3 weeks. Pt denies VB. States +FM. Antepartum course complicated by GDMA2-insulin.    allergies:  NKDA  medications:  prenatal vitamins  ademalog 17 units AC  basaglar 48 units HS - last dose 11pm 3/27/19    vs 101/63 HR 86     abdomen soft, nontender  sse: negative pooling/nitrazine/ferning  sve: 0/0/-3/I  taus: sliup, cephalic presentation, anterior placenta, bpp 8/8, liseth 9.7  nst in progress    ua, cbc, cmp, bile acids, amylase/lipase pending   mL IVPB x 1  benadryl 50 mg IVP x 1  tylenol 975 mg PO x 1    0716    7.3 > 11.5/38.7 < 235    ast/alt 25/28    UA  large leukocyte esterase   0-2 RBC  > 50 WBC    ucx pending      Discussed with Dr Estrada. Pt discharged home with RX for ursidiol 300 mg PO BID. Encouraged increased PO hydration. F/U next scheduled prenatal appointment.  labor precautions/fetal kick counts reviewed.

## 2019-03-29 LAB
BACTERIA UR CULT: SIGNIFICANT CHANGE UP
SPECIMEN SOURCE: SIGNIFICANT CHANGE UP

## 2019-03-30 LAB — BILE AC FLD-MCNC: 6.2 UMOL/L — SIGNIFICANT CHANGE UP (ref 4.7–24.5)

## 2019-04-05 ENCOUNTER — OUTPATIENT (OUTPATIENT)
Dept: INPATIENT UNIT | Facility: HOSPITAL | Age: 34
LOS: 1 days | Discharge: HOME | End: 2019-04-05

## 2019-04-05 ENCOUNTER — TRANSCRIPTION ENCOUNTER (OUTPATIENT)
Age: 34
End: 2019-04-05

## 2019-04-05 VITALS
TEMPERATURE: 98 F | DIASTOLIC BLOOD PRESSURE: 69 MMHG | SYSTOLIC BLOOD PRESSURE: 117 MMHG | HEART RATE: 93 BPM | RESPIRATION RATE: 20 BRPM

## 2019-04-05 VITALS — DIASTOLIC BLOOD PRESSURE: 62 MMHG | HEART RATE: 97 BPM | SYSTOLIC BLOOD PRESSURE: 112 MMHG

## 2019-04-05 DIAGNOSIS — Z3A.00 WEEKS OF GESTATION OF PREGNANCY NOT SPECIFIED: ICD-10-CM

## 2019-04-05 DIAGNOSIS — Z98.890 OTHER SPECIFIED POSTPROCEDURAL STATES: Chronic | ICD-10-CM

## 2019-04-05 DIAGNOSIS — O26.899 OTHER SPECIFIED PREGNANCY RELATED CONDITIONS, UNSPECIFIED TRIMESTER: ICD-10-CM

## 2019-04-05 LAB — GLUCOSE BLDC GLUCOMTR-MCNC: 83 MG/DL — SIGNIFICANT CHANGE UP (ref 70–99)

## 2019-04-05 RX ORDER — INSULIN LISPRO 100/ML
12 VIAL (ML) SUBCUTANEOUS
Qty: 0 | Refills: 0 | COMMUNITY

## 2019-04-05 RX ORDER — INSULIN LISPRO 100/ML
17 VIAL (ML) SUBCUTANEOUS
Qty: 0 | Refills: 0 | COMMUNITY

## 2019-04-05 NOTE — OB PROVIDER TRIAGE NOTE - HISTORY OF PRESENT ILLNESS
33 year old female P0 at 36.6 wks (EDC 4/27/19 ) who presents with contrxs stronger since 2p stated  q 5 min  denied any rom lof vb feels gfm    Pt is GDMA2 on insulin  stated fs well controlled  with insulin   stated   EFW 5.5# as of 3/11/19   GBS  done 4/2  results unknown

## 2019-04-05 NOTE — OB PROVIDER TRIAGE NOTE - NSHPPHYSICALEXAM_GEN_ALL_CORE
pt seen and examined    abd soft gravid nt    ve 4/70/-2 vtx    scan vtx    NST reactive with contrxs q4-8 min

## 2019-04-05 NOTE — OB PROVIDER TRIAGE NOTE - NSOBPROVIDERNOTE_OBGYN_ALL_OB_FT
33 year old P0 at 36/6 wks  early labor    case d/w Dr Estrada    discharge to ambulate    s/s of ptl reviewed

## 2019-04-05 NOTE — OB RN TRIAGE NOTE - PMH
Insulin controlled gestational diabetes mellitus (GDM) in third trimester    Ovarian cyst, right Insulin controlled gestational diabetes mellitus (GDM) in third trimester

## 2019-04-06 ENCOUNTER — TRANSCRIPTION ENCOUNTER (OUTPATIENT)
Age: 34
End: 2019-04-06

## 2019-04-06 ENCOUNTER — INPATIENT (INPATIENT)
Facility: HOSPITAL | Age: 34
LOS: 3 days | Discharge: HOME | End: 2019-04-10
Attending: OBSTETRICS & GYNECOLOGY | Admitting: OBSTETRICS & GYNECOLOGY
Payer: MEDICAID

## 2019-04-06 VITALS
DIASTOLIC BLOOD PRESSURE: 68 MMHG | TEMPERATURE: 98 F | SYSTOLIC BLOOD PRESSURE: 126 MMHG | HEART RATE: 111 BPM | RESPIRATION RATE: 17 BRPM

## 2019-04-06 DIAGNOSIS — O26.899 OTHER SPECIFIED PREGNANCY RELATED CONDITIONS, UNSPECIFIED TRIMESTER: ICD-10-CM

## 2019-04-06 DIAGNOSIS — Z98.890 OTHER SPECIFIED POSTPROCEDURAL STATES: Chronic | ICD-10-CM

## 2019-04-06 DIAGNOSIS — Z3A.00 WEEKS OF GESTATION OF PREGNANCY NOT SPECIFIED: ICD-10-CM

## 2019-04-06 LAB
BASOPHILS # BLD AUTO: 0.02 K/UL — SIGNIFICANT CHANGE UP (ref 0–0.2)
BASOPHILS NFR BLD AUTO: 0.2 % — SIGNIFICANT CHANGE UP (ref 0–2)
BLD GP AB SCN SERPL QL: NEGATIVE — SIGNIFICANT CHANGE UP
EOSINOPHIL # BLD AUTO: 0.07 K/UL — SIGNIFICANT CHANGE UP (ref 0–0.5)
EOSINOPHIL NFR BLD AUTO: 0.7 % — SIGNIFICANT CHANGE UP (ref 0–6)
GLUCOSE BLDC GLUCOMTR-MCNC: 104 MG/DL — HIGH (ref 70–99)
GLUCOSE BLDC GLUCOMTR-MCNC: 113 MG/DL — HIGH (ref 70–99)
GLUCOSE BLDC GLUCOMTR-MCNC: 113 MG/DL — HIGH (ref 70–99)
GLUCOSE BLDC GLUCOMTR-MCNC: 115 MG/DL — HIGH (ref 70–99)
GLUCOSE BLDC GLUCOMTR-MCNC: 116 MG/DL — HIGH (ref 70–99)
GLUCOSE BLDC GLUCOMTR-MCNC: 122 MG/DL — HIGH (ref 70–99)
GLUCOSE BLDC GLUCOMTR-MCNC: 124 MG/DL — HIGH (ref 70–99)
GLUCOSE BLDC GLUCOMTR-MCNC: 124 MG/DL — HIGH (ref 70–99)
GLUCOSE BLDC GLUCOMTR-MCNC: 129 MG/DL — HIGH (ref 70–99)
GLUCOSE BLDC GLUCOMTR-MCNC: 130 MG/DL — HIGH (ref 70–99)
GLUCOSE BLDC GLUCOMTR-MCNC: 131 MG/DL — HIGH (ref 70–99)
GLUCOSE BLDC GLUCOMTR-MCNC: 135 MG/DL — HIGH (ref 70–99)
GLUCOSE BLDC GLUCOMTR-MCNC: 138 MG/DL — HIGH (ref 70–99)
GLUCOSE BLDC GLUCOMTR-MCNC: 142 MG/DL — HIGH (ref 70–99)
HCT VFR BLD CALC: 38.9 % — SIGNIFICANT CHANGE UP (ref 34.5–45)
HGB BLD-MCNC: 12.1 G/DL — SIGNIFICANT CHANGE UP (ref 11.5–15.5)
IMM GRANULOCYTES NFR BLD AUTO: 0.3 % — SIGNIFICANT CHANGE UP (ref 0–1.5)
LYMPHOCYTES # BLD AUTO: 1.49 K/UL — SIGNIFICANT CHANGE UP (ref 1–3.3)
LYMPHOCYTES # BLD AUTO: 13.9 % — SIGNIFICANT CHANGE UP (ref 13–44)
MCHC RBC-ENTMCNC: 28.6 PG — SIGNIFICANT CHANGE UP (ref 27–34)
MCHC RBC-ENTMCNC: 31.1 % — LOW (ref 32–36)
MCV RBC AUTO: 92 FL — SIGNIFICANT CHANGE UP (ref 80–100)
MONOCYTES # BLD AUTO: 0.73 K/UL — SIGNIFICANT CHANGE UP (ref 0–0.9)
MONOCYTES NFR BLD AUTO: 6.8 % — SIGNIFICANT CHANGE UP (ref 2–14)
NEUTROPHILS # BLD AUTO: 8.39 K/UL — HIGH (ref 1.8–7.4)
NEUTROPHILS NFR BLD AUTO: 78.1 % — HIGH (ref 43–77)
NRBC # FLD: 0 K/UL — SIGNIFICANT CHANGE UP (ref 0–0)
PLATELET # BLD AUTO: 265 K/UL — SIGNIFICANT CHANGE UP (ref 150–400)
PMV BLD: 10.1 FL — SIGNIFICANT CHANGE UP (ref 7–13)
RBC # BLD: 4.23 M/UL — SIGNIFICANT CHANGE UP (ref 3.8–5.2)
RBC # FLD: 14.8 % — HIGH (ref 10.3–14.5)
RH IG SCN BLD-IMP: POSITIVE — SIGNIFICANT CHANGE UP
RH IG SCN BLD-IMP: POSITIVE — SIGNIFICANT CHANGE UP
WBC # BLD: 10.73 K/UL — HIGH (ref 3.8–10.5)
WBC # FLD AUTO: 10.73 K/UL — HIGH (ref 3.8–10.5)

## 2019-04-06 PROCEDURE — 59514 CESAREAN DELIVERY ONLY: CPT | Mod: U7

## 2019-04-06 RX ORDER — HYDROMORPHONE HYDROCHLORIDE 2 MG/ML
1 INJECTION INTRAMUSCULAR; INTRAVENOUS; SUBCUTANEOUS ONCE
Qty: 0 | Refills: 0 | Status: DISCONTINUED | OUTPATIENT
Start: 2019-04-06 | End: 2019-04-07

## 2019-04-06 RX ORDER — MORPHINE SULFATE 50 MG/1
4 CAPSULE, EXTENDED RELEASE ORAL ONCE
Qty: 0 | Refills: 0 | Status: DISCONTINUED | OUTPATIENT
Start: 2019-04-06 | End: 2019-04-06

## 2019-04-06 RX ORDER — DIPHENHYDRAMINE HCL 50 MG
25 CAPSULE ORAL EVERY 6 HOURS
Qty: 0 | Refills: 0 | Status: DISCONTINUED | OUTPATIENT
Start: 2019-04-06 | End: 2019-04-10

## 2019-04-06 RX ORDER — SODIUM CHLORIDE 9 MG/ML
1000 INJECTION, SOLUTION INTRAVENOUS
Qty: 0 | Refills: 0 | Status: DISCONTINUED | OUTPATIENT
Start: 2019-04-06 | End: 2019-04-06

## 2019-04-06 RX ORDER — INSULIN HUMAN 100 [IU]/ML
1 INJECTION, SOLUTION SUBCUTANEOUS
Qty: 100 | Refills: 0 | Status: DISCONTINUED | OUTPATIENT
Start: 2019-04-06 | End: 2019-04-06

## 2019-04-06 RX ORDER — CITRIC ACID/SODIUM CITRATE 300-500 MG
15 SOLUTION, ORAL ORAL EVERY 4 HOURS
Qty: 0 | Refills: 0 | Status: DISCONTINUED | OUTPATIENT
Start: 2019-04-06 | End: 2019-04-06

## 2019-04-06 RX ORDER — SODIUM CHLORIDE 9 MG/ML
1000 INJECTION, SOLUTION INTRAVENOUS
Qty: 0 | Refills: 0 | Status: DISCONTINUED | OUTPATIENT
Start: 2019-04-06 | End: 2019-04-07

## 2019-04-06 RX ORDER — AMPICILLIN TRIHYDRATE 250 MG
2 CAPSULE ORAL ONCE
Qty: 0 | Refills: 0 | Status: COMPLETED | OUTPATIENT
Start: 2019-04-06 | End: 2019-04-06

## 2019-04-06 RX ORDER — HEPARIN SODIUM 5000 [USP'U]/ML
5000 INJECTION INTRAVENOUS; SUBCUTANEOUS EVERY 12 HOURS
Qty: 0 | Refills: 0 | Status: DISCONTINUED | OUTPATIENT
Start: 2019-04-06 | End: 2019-04-10

## 2019-04-06 RX ORDER — OXYTOCIN 10 UNIT/ML
2 VIAL (ML) INJECTION
Qty: 30 | Refills: 0 | Status: DISCONTINUED | OUTPATIENT
Start: 2019-04-06 | End: 2019-04-06

## 2019-04-06 RX ORDER — OXYTOCIN 10 UNIT/ML
41.67 VIAL (ML) INJECTION
Qty: 20 | Refills: 0 | Status: DISCONTINUED | OUTPATIENT
Start: 2019-04-06 | End: 2019-04-07

## 2019-04-06 RX ORDER — SIMETHICONE 80 MG/1
80 TABLET, CHEWABLE ORAL EVERY 4 HOURS
Qty: 0 | Refills: 0 | Status: DISCONTINUED | OUTPATIENT
Start: 2019-04-06 | End: 2019-04-10

## 2019-04-06 RX ORDER — OXYCODONE HYDROCHLORIDE 5 MG/1
5 TABLET ORAL
Qty: 0 | Refills: 0 | Status: COMPLETED | OUTPATIENT
Start: 2019-04-06 | End: 2019-04-13

## 2019-04-06 RX ORDER — OXYCODONE HYDROCHLORIDE 5 MG/1
10 TABLET ORAL
Qty: 0 | Refills: 0 | Status: DISCONTINUED | OUTPATIENT
Start: 2019-04-06 | End: 2019-04-07

## 2019-04-06 RX ORDER — OXYTOCIN 10 UNIT/ML
333.33 VIAL (ML) INJECTION
Qty: 20 | Refills: 0 | Status: COMPLETED | OUTPATIENT
Start: 2019-04-06 | End: 2019-04-06

## 2019-04-06 RX ORDER — OXYCODONE HYDROCHLORIDE 5 MG/1
5 TABLET ORAL EVERY 4 HOURS
Qty: 0 | Refills: 0 | Status: COMPLETED | OUTPATIENT
Start: 2019-04-06 | End: 2019-04-13

## 2019-04-06 RX ORDER — OXYCODONE HYDROCHLORIDE 5 MG/1
5 TABLET ORAL
Qty: 0 | Refills: 0 | Status: DISCONTINUED | OUTPATIENT
Start: 2019-04-06 | End: 2019-04-07

## 2019-04-06 RX ORDER — FERROUS SULFATE 325(65) MG
325 TABLET ORAL DAILY
Qty: 0 | Refills: 0 | Status: DISCONTINUED | OUTPATIENT
Start: 2019-04-06 | End: 2019-04-10

## 2019-04-06 RX ORDER — ONDANSETRON 8 MG/1
4 TABLET, FILM COATED ORAL EVERY 6 HOURS
Qty: 0 | Refills: 0 | Status: DISCONTINUED | OUTPATIENT
Start: 2019-04-06 | End: 2019-04-07

## 2019-04-06 RX ORDER — IBUPROFEN 200 MG
600 TABLET ORAL EVERY 6 HOURS
Qty: 0 | Refills: 0 | Status: DISCONTINUED | OUTPATIENT
Start: 2019-04-06 | End: 2019-04-08

## 2019-04-06 RX ORDER — OXYTOCIN 10 UNIT/ML
333.33 VIAL (ML) INJECTION
Qty: 20 | Refills: 0 | Status: DISCONTINUED | OUTPATIENT
Start: 2019-04-06 | End: 2019-04-07

## 2019-04-06 RX ORDER — HEPARIN SODIUM 5000 [USP'U]/ML
5000 INJECTION INTRAVENOUS; SUBCUTANEOUS EVERY 12 HOURS
Qty: 0 | Refills: 0 | Status: DISCONTINUED | OUTPATIENT
Start: 2019-04-06 | End: 2019-04-06

## 2019-04-06 RX ORDER — AMPICILLIN TRIHYDRATE 250 MG
1 CAPSULE ORAL EVERY 4 HOURS
Qty: 0 | Refills: 0 | Status: DISCONTINUED | OUTPATIENT
Start: 2019-04-06 | End: 2019-04-06

## 2019-04-06 RX ORDER — ACETAMINOPHEN 500 MG
975 TABLET ORAL EVERY 6 HOURS
Qty: 0 | Refills: 0 | Status: DISCONTINUED | OUTPATIENT
Start: 2019-04-06 | End: 2019-04-10

## 2019-04-06 RX ORDER — SODIUM CHLORIDE 9 MG/ML
1000 INJECTION, SOLUTION INTRAVENOUS ONCE
Qty: 0 | Refills: 0 | Status: DISCONTINUED | OUTPATIENT
Start: 2019-04-06 | End: 2019-04-06

## 2019-04-06 RX ORDER — DOCUSATE SODIUM 100 MG
100 CAPSULE ORAL
Qty: 0 | Refills: 0 | Status: DISCONTINUED | OUTPATIENT
Start: 2019-04-06 | End: 2019-04-10

## 2019-04-06 RX ORDER — OXYTOCIN 10 UNIT/ML
41.67 VIAL (ML) INJECTION
Qty: 20 | Refills: 0 | Status: DISCONTINUED | OUTPATIENT
Start: 2019-04-06 | End: 2019-04-06

## 2019-04-06 RX ORDER — NALOXONE HYDROCHLORIDE 4 MG/.1ML
0.1 SPRAY NASAL
Qty: 0 | Refills: 0 | Status: DISCONTINUED | OUTPATIENT
Start: 2019-04-06 | End: 2019-04-07

## 2019-04-06 RX ORDER — OXYTOCIN 10 UNIT/ML
333.33 VIAL (ML) INJECTION
Qty: 20 | Refills: 0 | Status: COMPLETED | OUTPATIENT
Start: 2019-04-06

## 2019-04-06 RX ORDER — TETANUS TOXOID, REDUCED DIPHTHERIA TOXOID AND ACELLULAR PERTUSSIS VACCINE, ADSORBED 5; 2.5; 8; 8; 2.5 [IU]/.5ML; [IU]/.5ML; UG/.5ML; UG/.5ML; UG/.5ML
0.5 SUSPENSION INTRAMUSCULAR ONCE
Qty: 0 | Refills: 0 | Status: DISCONTINUED | OUTPATIENT
Start: 2019-04-06 | End: 2019-04-10

## 2019-04-06 RX ORDER — OXYTOCIN 10 UNIT/ML
6 VIAL (ML) INJECTION
Qty: 30 | Refills: 0 | Status: DISCONTINUED | OUTPATIENT
Start: 2019-04-06 | End: 2019-04-06

## 2019-04-06 RX ORDER — LANOLIN
1 OINTMENT (GRAM) TOPICAL
Qty: 0 | Refills: 0 | Status: DISCONTINUED | OUTPATIENT
Start: 2019-04-06 | End: 2019-04-10

## 2019-04-06 RX ORDER — AMPICILLIN TRIHYDRATE 250 MG
CAPSULE ORAL
Qty: 0 | Refills: 0 | Status: DISCONTINUED | OUTPATIENT
Start: 2019-04-06 | End: 2019-04-06

## 2019-04-06 RX ORDER — BUTORPHANOL TARTRATE 2 MG/ML
0.12 INJECTION, SOLUTION INTRAMUSCULAR; INTRAVENOUS EVERY 6 HOURS
Qty: 0 | Refills: 0 | Status: DISCONTINUED | OUTPATIENT
Start: 2019-04-06 | End: 2019-04-07

## 2019-04-06 RX ORDER — GLYCERIN ADULT
1 SUPPOSITORY, RECTAL RECTAL AT BEDTIME
Qty: 0 | Refills: 0 | Status: DISCONTINUED | OUTPATIENT
Start: 2019-04-06 | End: 2019-04-10

## 2019-04-06 RX ADMIN — Medication 208 GRAM(S): at 15:06

## 2019-04-06 RX ADMIN — SODIUM CHLORIDE 125 MILLILITER(S): 9 INJECTION, SOLUTION INTRAVENOUS at 21:02

## 2019-04-06 RX ADMIN — MORPHINE SULFATE 4 MILLIGRAM(S): 50 CAPSULE, EXTENDED RELEASE ORAL at 08:14

## 2019-04-06 RX ADMIN — MORPHINE SULFATE 4 MILLIGRAM(S): 50 CAPSULE, EXTENDED RELEASE ORAL at 08:31

## 2019-04-06 RX ADMIN — Medication 1000 MILLIUNIT(S)/MIN: at 21:02

## 2019-04-06 RX ADMIN — SODIUM CHLORIDE 75 MILLILITER(S): 9 INJECTION, SOLUTION INTRAVENOUS at 21:27

## 2019-04-06 RX ADMIN — Medication 2 MILLIUNIT(S)/MIN: at 08:29

## 2019-04-06 RX ADMIN — Medication 216 GRAM(S): at 07:06

## 2019-04-06 RX ADMIN — Medication 208 GRAM(S): at 11:00

## 2019-04-06 RX ADMIN — Medication 125 MILLIUNIT(S)/MIN: at 21:02

## 2019-04-06 RX ADMIN — MORPHINE SULFATE 4 MILLIGRAM(S): 50 CAPSULE, EXTENDED RELEASE ORAL at 08:15

## 2019-04-06 RX ADMIN — INSULIN HUMAN 1 UNIT(S)/HR: 100 INJECTION, SOLUTION SUBCUTANEOUS at 04:09

## 2019-04-06 NOTE — CHART NOTE - NSCHARTNOTEFT_GEN_A_CORE
PA Labor Note    Patient seen & examined for cont of management. Denies need for pain intervention.     VS  T(C): 36.6 (04-06-19 @ 06:01)  HR: 95 (04-06-19 @ 07:20)  BP: 118/81 (04-06-19 @ 07:20)  RR: 18 (04-06-19 @ 02:33)  SpO2: 99% (04-06-19 @ 07:16)\    130/mod luis e/+accels/no decels  [place] q 4-5min  4.5/80/-2 AROM clear as per Dr Carreon    cont efm/toco  amp for +GBS  pitocin for augmentation of labor  dw Dr Carreon  ldevito pa

## 2019-04-06 NOTE — OB PROVIDER TRIAGE NOTE - HISTORY OF PRESENT ILLNESS
33 year old female P0 at 36.6 wks (EDC 4/27/19 ) who presents with contrxs stronger since 2p stated  q 5 min  denied any rom lof vb feels gfm  pt was seen earlier and returns from ambulation feels more contrxs and back pains    Pt is GDMA2 on insulin  stated fs well controlled  with insulin   stated   EFW 5.5# as of 3/11/19   GBS  done 4/2  results unknown

## 2019-04-06 NOTE — CHART NOTE - NSCHARTNOTEFT_GEN_A_CORE
PA Note    Patient seen & examined for cont of management. Comfortable with epidural     VS  T(C): 36.5 (19 @ 13:37)  HR: 86 (19 @ 14:21)  BP: 116/65 (19 @ 14:17)  RR: 18 (19 @ 02:33)  SpO2: 98% (19 @ 14:16)    145/mod luis e/+accels/no decels  [place] q 3-4min  9/100/-1    cont efm/toco  cont pitocin   anticipated  dw DR Velma antonio

## 2019-04-06 NOTE — OB NEONATOLOGY/PEDIATRICIAN DELIVERY SUMMARY - NSPEDSNEONOTESA_OBGYN_ALL_OB_FT
Baby girl Idalia born by CS for arrest of descent to a 34 yo  O+ blood type mother. Maternal hx of GDM A2 on insulin. No prenatal hx. PNL neg/NR/imm. GBS+ on , treated with Amp x4. AROM was with clear fluid at 07:15 on day of delivery, TOB 20:18. Baby emerged crying and vigorous, was w/d/s/s, APGARs 9/9. Slight varus deformity of L foot noted, otherwise benign exam. Mother of child would like to breastfeed and wants the baby to receive Hep B prior to discharge. EOS 0.09.

## 2019-04-06 NOTE — DISCHARGE NOTE OB - CARE PROVIDER_API CALL
Fermin Estrada)  MaternalFetal Medicine; Obstetrics and Gynecology  [address] [address] [place] [address], Room [address]  [place] [address]  Phone: [phone]  Fax: [phone]  Follow Up Time: Fermin Estrada)  MaternalFetal Medicine; Obstetrics and Gynecology  [address] [place] [address], Room [address]  [place] [address]  Phone: [phone]  Fax: [phone]  Follow Up Time:     nayla parker  Phone: [phone]  Fax: (   )    -  Follow Up Time:

## 2019-04-06 NOTE — DISCHARGE NOTE OB - PROVIDER TOKENS
PROVIDER:[TOKEN:[[id number]]] PROVIDER:[TOKEN:[[id number]]],FREE:[LAST:[keith],FIRST:[nayla],PHONE:[phone]],FAX:[(   )    -]]

## 2019-04-06 NOTE — DISCHARGE NOTE OB - PATIENT PORTAL LINK FT
You can access the [facility] Patient Portal, offered by [facility], by registering with the following website: [email or web address]

## 2019-04-06 NOTE — DISCHARGE NOTE OB - MEDICATION SUMMARY - MEDICATIONS TO STOP TAKING
I will STOP taking the medications listed below when I get home from the hospital:    Basaglar KwikPen 100 units/mL subcutaneous solution  -- 48 international unit(s) subcutaneous once a day (at bedtime)    Admelog 100 units/mL injectable solution  -- 12 international unit(s) injectable 2 times a day(break fast and lunch)    Admelog 100 units/mL injectable solution  -- 17  injectable IU once a day(bedtime)    citronatal  -- 2 cap(s) enteral once a day

## 2019-04-06 NOTE — OB RN DELIVERY SUMMARY - NS_LABORCHARACTER_OBGYN_ALL_OB
Induction of labor-AROM/Internal electronic FM/Antibiotics in labor/External electronic FM/Induction of labor-Medicinal/Augmentation of labor

## 2019-04-06 NOTE — OB PROVIDER TRIAGE NOTE - NSHPLABSRESULTS_GEN_ALL_CORE
scan vtx    NST reactive with contrxs q2-4 min   FS on adm 130  pt did eat and did not take insulin    repeat FS

## 2019-04-06 NOTE — OB PROVIDER DELIVERY SUMMARY - NSPROVIDERDELIVERYNOTE_OBGYN_ALL_OB_FT
b/l grossly normal tubes and ovaries, normal uterus  cervical extension, repaired  fibrillar x2 used over hysterotomy  good hemostasis obtained b/l grossly normal tubes and ovaries, normal uterus  cervical extension, repaired  hemabate and methergine x1 administered upon hysterotomy closure   fibrillar x2 used over hysterotomy  good hemostasis obtained b/l grossly normal tubes and ovaries, normal uterus  cervical extension, repaired  hemabate and methergine x1 administered upon hysterotomy closure   fibrillar x2 used over hysterotomy  good hemostasis obtained    [id number]- dictation number

## 2019-04-06 NOTE — OB PROVIDER H&P - NSHPPHYSICALEXAM_GEN_ALL_CORE
pt seen and examined    abd soft gravid nt    ve 5/70/-2 vtx    scan vtx    NST reactive with contrxs q4-8 min

## 2019-04-06 NOTE — OB PROVIDER H&P - ASSESSMENT
37 wks gestation  labor   GBs unknown  pcn coverage    GDMA2  FS elevated for insulin drip    epidural prn    case d/w Dr winslow

## 2019-04-06 NOTE — OB RN DELIVERY SUMMARY - NS_FORCEPSATTEMPT_OBGYN_ALL_OB
Assessment    1  Well child visit (V20 2) (Z00 129)   2  Encounter for hearing evaluation (V72 19) (Z01 10)   3  Encounter for vision screening (V72 0) (Z01 00)   4  Family history of Benign hypertension : Grandmother   5  Family history of diabetes mellitus (V18 0) (Z83 3) : Grandmother    Plan  Encounter for hearing evaluation    · SCREEN AUDIOGRAM- POC; Status:Active - Perform Order; Requested for:[id number];   Encounter for hearing evaluation, Encounter for vision screening    · SNELLEN VISION- POC; Status:Active - Perform Order; Requested for:[id number]; Health Maintenance    · Follow-up visit in 1 year Evaluation and Treatment  Follow-up  Status: Hold For -  Scheduling  Requested for: [id number]   · Brush your child's teeth after every meal and before bedtime ; Status:Complete;   Done:  [id number]   · Good hand washing is one of the best ways to control the spread of germs ;  Status:Complete;   Done: 32CHF0858   · Have your child begin routine exercise and active play ; Status:Complete;   Done:  59YIR5005   · Keep your child away from cigarette smoke ; Status:Complete;   Done: 49GDP4716   · Protect your child's skin from the effects of the sun ; Status:Complete;   Done: 16BAE6354   · We recommend routine visits to a dentist ; Status:Complete;   Done: 82TXL2064   · We recommend you offer your child a diet that is low in fat and rich in fruits and  vegetables  Avoid high intake of sweetened beverages like soda and fruit juices  We  encourage you to eat meals and scheduled snacks as a family   Offer your child new  foods regularly but do not force him or her to eat specific foods ; Status:Complete;   Done:  86BNQ6276   · Call [phone] if: You are concerned about your child's behavior at home or at  school ; Status:Complete;   Done: 70PKU7172   · Call [phone] if: You are concerned about your child's development ;  Status:Complete;   Done: 09OMN8898   · Call [phone] if: Your daughter shows signs of more pubertal development ;  Status:Complete;   Done: 90Agl3164    Discussion/Summary    Impression:   No growth, development, elimination, feeding, skin and sleep concerns  no medical problems  Anticipatory guidance addressed as per the history of present illness section  No vaccines needed  He is not on any medications  Information discussed with patient and mother  Chief Complaint  Patient presents to office for a health maintenance exam       History of Present Illness  HM, 9-12 years Male (Brief): Josh Jones presents today for routine health maintenance with his mother   Social and birth history reviewed  General Health: The child's health since the last visit is described as good   no illness since last visit  Dental hygiene: Good  Immunization status: Up to date   the patient has not had any significant adverse reactions to immunizations  Caregiver concerns:   Caregivers deny concerns regarding nutrition, sleep, behavior, school, development and elimination  Nutrition/Elimination:   Diet:  the child's current diet is diverse and healthy  Dietary supplements:  The patient does not use dietary supplements  Elimination:  No elimination issues are expressed  Sleep:  No sleep issues are reported  Behavior:  No behavior issues identified  The child's temperament is described as calm and happy  Health Risks:  No significant risk factors are identified  no tuberculosis risk factors  Safety elements used:   safety elements were discussed and are adequate  Childcare/School: The child stays home with siblings  Childcare is provided in the child's home  He is in grade 7th in [address] [employer]  School performance has been excellent     Sports Participation Questions:   History Questions: Cardiac History: no chest pain during exercise, no chest pressure during exercise, no chest discomfort during exercise, no prior EKG or Echo, no heart racing with exercise, no history of heart infection, no history of a heart murmur, no history of high blood pressure, no history of high cholesterol, no passing out or nearly passing out during exercise, has not passed out or nearly passed out after exercise and heart does not skip beats with exercise  Family History: no family history of death for no apparent reason, no family history of heart problems, no family history of sudden death or MI before age 48, no family history of Marfan Syndrome and no family history of asthma  Review of Systems    Constitutional: No complaints of tiredness, feels well, no fever, no chills, no recent weight gain or loss  Eyes: No complaints of eye pain, no discharge from eyes, no eyesight problems, eyes do not itch, no red or dry eyes  ENT: no complaints of nasal discharge, no earache, no loss of hearing, no hoarseness or sore throat, no nosebleeds  Cardiovascular: No complaints of chest pain, no palpitations, normal heart rate, no leg claudication or lower leg edema  Respiratory: No complaints of shortness of breath, no wheezing or cough, no dyspnea on exertion  Gastrointestinal: No complaints of abdominal pain, no nausea or vomiting, no constipation, no diarrhea or bloody stools  Genitourinary: No complaints of testicular pain, no dysuria or nocturia, no incontinence, no hesitancy, no gential lesion  Musculoskeletal: No complaints of joint stiffness or swelling, no myalgias, no limb pain or swelling  Integumentary: No complaints of skin rash, no skin lesions or wounds, no itching, no dry skin  Neurological: No complaints of headache, no numbness or tingling, no dizziness or fainting, no confusion, no convulsions, no limb weakness or difficulty walking  Psychiatric: No complaints of feeling depressed, no suicidal thoughts, no emotional problems, no anxiety, no sleep disturbances or changes in personality     Endocrine: No complaints of muscle weakness, no feelings of weakness, no erectile dysfunction, no deepening of voice, no hot flashes or proptosis  Hematologic/Lymphatic: No complaints of swollen glands, no neck swollen glands, does not bleed or bruise easily  ROS reported by the patient  Family History  Grandmother    · Family history of Benign hypertension   · Family history of diabetes mellitus (V18 0) (Z83 3)    Social History    · Never a smoker    Current Meds   1  No Reported Medications Recorded    Allergies    1  No Known Drug Allergies    2  No Known Food Allergies    Vitals   Recorded: 00TSI1091 19:15EF   Systolic 314   Diastolic 72   Heart Rate 84   Respiration 16   Height 4 ft 10 03 in   Weight 86 lb    BMI Calculated 17 95   BSA Calculated 1 27   BMI Percentile 44 %   2-20 Stature Percentile 18 %   2-20 Weight Percentile 24 %     Physical Exam    Constitutional - General appearance: No acute distress, well appearing and well nourished  Head and Face - Head and face: Normocephalic, atraumatic  Palpation of the face and sinuses: Normal, no sinus tenderness  Eyes - Conjunctiva and lids: No injection, edema or discharge  Pupils and irises: Equal, round, reactive to light bilaterally  Ophthalmoscopic examination: Optic discs sharp  Ears, Nose, Mouth, and Throat - External inspection of ears and nose: Normal without deformities or discharge  Otoscopic examination: Tympanic membranes gray, translucent with good bony landmarks and light reflex  Canals patent without erythema  Hearing: Normal  Nasal mucosa, septum, and turbinates: Normal, no edema or discharge  Lips, teeth, and gums: Normal, good dentition  Oropharynx: Moist mucosa, normal tongue and tonsils without lesions  Neck - Neck: Supple, symmetric, no masses  Thyroid: No thyromegaly  Pulmonary - Respiratory effort: Normal respiratory rate and rhythm, no increased work of breathing  Percussion of chest: Normal  Auscultation of lungs: Clear bilaterally  Cardiovascular - Palpation of heart: Normal PMI, no thrill  Auscultation of heart: Regular rate and rhythm, normal S1 and S2, no murmur  Examination of extremities for edema and/or varicosities: Normal    Chest - Chest: Normal    Abdomen - Abdomen: Normal bowel sounds, soft, non-tender, no masses  Liver and spleen: No hepatomegaly or splenomegaly  Examination for hernias: No hernias palpated  Genitourinary - Scrotal contents: Normal, no masses appreciated  Penis: Normal, no lesions  Lymphatic - Palpation of lymph nodes in neck: No anterior or posterior cervical lymphadenopathy  Palpation of lymph nodes in axillae: No lymphadenopathy  Musculoskeletal - Gait and station: Normal gait  Digits and nails: Normal without clubbing or cyanosis  Inspection/palpation of joints, bones, and muscles: Normal  Evaluation for scoliosis: No scoliosis on exam  Range of motion: Normal  Stability: No joint instability  Muscle strength/tone: Normal    Skin - Skin and subcutaneous tissue: No rash or lesions  Palpation of skin and subcutaneous tissue: Normal    Neurologic - Cranial nerves: Normal  Cortical function: Normal  Reflexes: Normal  Sensation: Normal  Coordination: Normal    Psychiatric - judgment and insight: Normal  Orientation to person, place, and time: Normal  Recent and remote memory: Normal  Mood and affect: Normal       Procedure    Procedure: Hearing Acuity Test    Indication: Routine screeing  Audiometry: Normal bilaterally  Hearing in the right ear: 20,25,40 decibals at 500 hertz, 20,25,40 decibals at 1000 hertz, 20,25,40 decibals at 2000 hertz and 20,25,40 decibals at 4000 hertz  Hearing in the left ear: 20,25,40 decibals at 500 hertz, 20,25,40 decibals at 1000 hertz, 20,25,40 decibals at 2000 hertz and 20,25,40 decibals at 4000 hertz  Procedure: Visual Acuity Test    Indication: routine screening  Inforrmation supplied by a Snellen chart     Results: 20/15 in both eyes without corrective device, 20/20 in the right eye without corrective device, 20/20 in the left eye without corrective device      Signatures   Electronically signed by : Franky Chau MD; Aug 25 2016  2:13PM EST                       (Author) Forceps were not used

## 2019-04-06 NOTE — DISCHARGE NOTE OB - CARE PROVIDERS DIRECT ADDRESSES
,[email or web address],DirectAddress_Unknown

## 2019-04-06 NOTE — DISCHARGE NOTE OB - MEDICATION SUMMARY - MEDICATIONS TO TAKE
I will START or STAY ON the medications listed below when I get home from the hospital:    acetaminophen 325 mg oral tablet  -- 3 tab(s) by mouth every 6 hours, As Needed  -- Indication: For pain    Prenatal Multivitamins with Folic Acid 1 mg oral tablet  -- 1 tab(s) by mouth once a day  -- Indication: For vit

## 2019-04-06 NOTE — PROGRESS NOTE ADULT - SUBJECTIVE AND OBJECTIVE BOX
S:  Arrest      O: FD +1  T(C): 36.4 (17:27)  HR: 87 (18:43)  BP: 115/61 (18:32)  RR: 18 (02:33)  SpO2: 94% (18:43)    No descent after pushing for >2 1/2hrs  Desires c/s and refuses to push.  Risks and options reviewed.  Questions answered. Consent signed  -

## 2019-04-06 NOTE — CHART NOTE - NSCHARTNOTEFT_GEN_A_CORE
R1 Note 04-06-19 @ 10:40    Pt evaluated for pain.    VITALS:  T(C): 36.8 (04-06-19 @ 09:30), Max: 36.9 (04-06-19 @ 00:33)  HR: 112 (04-06-19 @ 10:36) (77 - 112)  BP: 112/68 (04-06-19 @ 09:54) (109/70 - 133/86)  RR: 18 (04-06-19 @ 02:33) (17 - 20)  SpO2: 98% (04-06-19 @ 10:31) (93% - 100%)    EFM: , mod luis e, no accel, no decel  [place]: Ctx Q2-3minmin  VE: 5/100/-2      IMPRESSION:   FHR Category: 1  Additional:    PLAN:  Cytotec:  [ ] Continue PO Cyto     [ ] Continue VCyto   [ ] Stop Cytotec  Pitocin: [ ] Start Pitocin   [ ] Increase Pitocin  [  ] Continue Pitocin  [ ] Decrease Pitocin   [ ] Discontinue Pitocin  [x ]Expectant management [ ]Peanut ball [ ]Labor down  [ ]Re-evaluate  Additional: Epidural    David Wiley PGY-1

## 2019-04-07 LAB
BASOPHILS # BLD AUTO: 0.03 K/UL — SIGNIFICANT CHANGE UP (ref 0–0.2)
BASOPHILS NFR BLD AUTO: 0.2 % — SIGNIFICANT CHANGE UP (ref 0–2)
EOSINOPHIL # BLD AUTO: 0.01 K/UL — SIGNIFICANT CHANGE UP (ref 0–0.5)
EOSINOPHIL NFR BLD AUTO: 0.1 % — SIGNIFICANT CHANGE UP (ref 0–6)
GLUCOSE BLDC GLUCOMTR-MCNC: 113 MG/DL — HIGH (ref 70–99)
GLUCOSE BLDC GLUCOMTR-MCNC: 123 MG/DL — HIGH (ref 70–99)
GLUCOSE BLDC GLUCOMTR-MCNC: 79 MG/DL — SIGNIFICANT CHANGE UP (ref 70–99)
HCT VFR BLD CALC: 34.6 % — SIGNIFICANT CHANGE UP (ref 34.5–45)
HGB BLD-MCNC: 10.6 G/DL — LOW (ref 11.5–15.5)
IMM GRANULOCYTES NFR BLD AUTO: 0.4 % — SIGNIFICANT CHANGE UP (ref 0–1.5)
LYMPHOCYTES # BLD AUTO: 1.29 K/UL — SIGNIFICANT CHANGE UP (ref 1–3.3)
LYMPHOCYTES # BLD AUTO: 9.3 % — LOW (ref 13–44)
MCHC RBC-ENTMCNC: 28.4 PG — SIGNIFICANT CHANGE UP (ref 27–34)
MCHC RBC-ENTMCNC: 30.6 % — LOW (ref 32–36)
MCV RBC AUTO: 92.8 FL — SIGNIFICANT CHANGE UP (ref 80–100)
MONOCYTES # BLD AUTO: 0.92 K/UL — HIGH (ref 0–0.9)
MONOCYTES NFR BLD AUTO: 6.6 % — SIGNIFICANT CHANGE UP (ref 2–14)
NEUTROPHILS # BLD AUTO: 11.6 K/UL — HIGH (ref 1.8–7.4)
NEUTROPHILS NFR BLD AUTO: 83.4 % — HIGH (ref 43–77)
NRBC # FLD: 0 K/UL — SIGNIFICANT CHANGE UP (ref 0–0)
PLATELET # BLD AUTO: 228 K/UL — SIGNIFICANT CHANGE UP (ref 150–400)
PMV BLD: 10.4 FL — SIGNIFICANT CHANGE UP (ref 7–13)
RBC # BLD: 3.73 M/UL — LOW (ref 3.8–5.2)
RBC # FLD: 15 % — HIGH (ref 10.3–14.5)
T PALLIDUM AB TITR SER: NEGATIVE — SIGNIFICANT CHANGE UP
WBC # BLD: 13.91 K/UL — HIGH (ref 3.8–10.5)
WBC # FLD AUTO: 13.91 K/UL — HIGH (ref 3.8–10.5)

## 2019-04-07 RX ORDER — OXYCODONE HYDROCHLORIDE 5 MG/1
5 TABLET ORAL
Qty: 0 | Refills: 0 | Status: DISCONTINUED | OUTPATIENT
Start: 2019-04-07 | End: 2019-04-08

## 2019-04-07 RX ORDER — SODIUM CHLORIDE 9 MG/ML
3 INJECTION INTRAMUSCULAR; INTRAVENOUS; SUBCUTANEOUS EVERY 8 HOURS
Qty: 0 | Refills: 0 | Status: DISCONTINUED | OUTPATIENT
Start: 2019-04-07 | End: 2019-04-10

## 2019-04-07 RX ORDER — OXYCODONE HYDROCHLORIDE 5 MG/1
5 TABLET ORAL EVERY 4 HOURS
Qty: 0 | Refills: 0 | Status: DISCONTINUED | OUTPATIENT
Start: 2019-04-07 | End: 2019-04-10

## 2019-04-07 RX ADMIN — OXYCODONE HYDROCHLORIDE 5 MILLIGRAM(S): 5 TABLET ORAL at 10:19

## 2019-04-07 RX ADMIN — Medication 975 MILLIGRAM(S): at 01:19

## 2019-04-07 RX ADMIN — Medication 975 MILLIGRAM(S): at 19:00

## 2019-04-07 RX ADMIN — Medication 975 MILLIGRAM(S): at 06:40

## 2019-04-07 RX ADMIN — Medication 975 MILLIGRAM(S): at 06:10

## 2019-04-07 RX ADMIN — SODIUM CHLORIDE 3 MILLILITER(S): 9 INJECTION INTRAMUSCULAR; INTRAVENOUS; SUBCUTANEOUS at 14:48

## 2019-04-07 RX ADMIN — OXYCODONE HYDROCHLORIDE 5 MILLIGRAM(S): 5 TABLET ORAL at 15:30

## 2019-04-07 RX ADMIN — Medication 975 MILLIGRAM(S): at 13:30

## 2019-04-07 RX ADMIN — Medication 975 MILLIGRAM(S): at 18:00

## 2019-04-07 RX ADMIN — Medication 975 MILLIGRAM(S): at 12:30

## 2019-04-07 RX ADMIN — Medication 325 MILLIGRAM(S): at 12:30

## 2019-04-07 RX ADMIN — Medication 100 MILLIGRAM(S): at 20:45

## 2019-04-07 RX ADMIN — OXYCODONE HYDROCHLORIDE 5 MILLIGRAM(S): 5 TABLET ORAL at 19:00

## 2019-04-07 RX ADMIN — Medication 975 MILLIGRAM(S): at 23:46

## 2019-04-07 RX ADMIN — HEPARIN SODIUM 5000 UNIT(S): 5000 INJECTION INTRAVENOUS; SUBCUTANEOUS at 12:30

## 2019-04-07 RX ADMIN — SODIUM CHLORIDE 3 MILLILITER(S): 9 INJECTION INTRAMUSCULAR; INTRAVENOUS; SUBCUTANEOUS at 06:50

## 2019-04-07 RX ADMIN — Medication 100 MILLIGRAM(S): at 06:09

## 2019-04-07 RX ADMIN — OXYCODONE HYDROCHLORIDE 5 MILLIGRAM(S): 5 TABLET ORAL at 06:10

## 2019-04-07 RX ADMIN — Medication 1 TABLET(S): at 12:30

## 2019-04-07 RX ADMIN — OXYCODONE HYDROCHLORIDE 5 MILLIGRAM(S): 5 TABLET ORAL at 02:50

## 2019-04-07 RX ADMIN — HEPARIN SODIUM 5000 UNIT(S): 5000 INJECTION INTRAVENOUS; SUBCUTANEOUS at 00:49

## 2019-04-07 RX ADMIN — OXYCODONE HYDROCHLORIDE 5 MILLIGRAM(S): 5 TABLET ORAL at 18:00

## 2019-04-07 RX ADMIN — Medication 975 MILLIGRAM(S): at 00:49

## 2019-04-07 RX ADMIN — OXYCODONE HYDROCHLORIDE 5 MILLIGRAM(S): 5 TABLET ORAL at 20:45

## 2019-04-07 RX ADMIN — HEPARIN SODIUM 5000 UNIT(S): 5000 INJECTION INTRAVENOUS; SUBCUTANEOUS at 23:46

## 2019-04-07 RX ADMIN — OXYCODONE HYDROCHLORIDE 5 MILLIGRAM(S): 5 TABLET ORAL at 03:20

## 2019-04-07 RX ADMIN — OXYCODONE HYDROCHLORIDE 5 MILLIGRAM(S): 5 TABLET ORAL at 06:40

## 2019-04-07 RX ADMIN — OXYCODONE HYDROCHLORIDE 5 MILLIGRAM(S): 5 TABLET ORAL at 14:48

## 2019-04-07 RX ADMIN — OXYCODONE HYDROCHLORIDE 5 MILLIGRAM(S): 5 TABLET ORAL at 23:46

## 2019-04-07 RX ADMIN — OXYCODONE HYDROCHLORIDE 5 MILLIGRAM(S): 5 TABLET ORAL at 11:15

## 2019-04-07 NOTE — PROGRESS NOTE ADULT - PROBLEM SELECTOR PLAN 1
-Encourage Ambulation  -Continue with regular diet  -Heparin, SCDs, and ambulation for DVT ppx  -Check CBC  -Analgesia as needed    Devi Rainey MD PGY1  Pager #[phone]

## 2019-04-07 NOTE — PROGRESS NOTE ADULT - SUBJECTIVE AND OBJECTIVE BOX
Postpartum Note,  Section     33y year old woman post-operative day 1 from [facility].  No acute events overnight.  Patient has no complaints and pain is well-controlled.  Patient is tolerating regular diet, passing flatus, ambulating, is voiding spontaneously.  Postpartum bleeding is well controlled. Patient denies headache, blurry vision, epigastric pain, shortness of breath. No lightheadedness, dizziness, chest pain, or palpitations.    Physical exam:    Vital Signs Last 24 Hrs  T(C): 36.9 (2019 01:52), Max: 37.4 (2019 15:24)  T(F): 98.5 (2019 01:52), Max: 99.32 (2019 15:24)  HR: 84 (:) (76 - 120)  BP: 119/74 (:52) (94/50 - 173/119)  BP(mean): 76 (2019 22:15) (72 - 92)  RR: 20 (:52) (20 - 23)  SpO2: 99% (:52) (88% - 100%)    05 @ 07:01  -  06 @ 07:00  --------------------------------------------------------  IN: 975 mL / OUT: 0 mL / NET: 975 mL     @ 07:01  -   @ 05:40  --------------------------------------------------------  IN: 2900 mL / OUT: 2450 mL / NET: 450 mL        Gen: NAD  Abdomen: Soft, nontender, no distension, firm uterine fundus at umbilicus.  Incision: Clean, dry, and intact  Pelvic: Normal lochia noted  Ext: No calf tenderness    LABS:                        12.1   10.73 )-----------( 265      ( 2019 02:10 )             38.9                 q    acetaminophen   Tablet .. 975 milliGRAM(s) Oral every 6 hours  butorphanol Injectable 0.125 milliGRAM(s) IV Push every 6 hours PRN  diphenhydrAMINE 25 milliGRAM(s) Oral every 6 hours PRN  diphtheria/tetanus/pertussis (acellular) Vaccine (ADAcel) 0.5 milliLiter(s) IntraMuscular once  docusate sodium 100 milliGRAM(s) Oral two times a day PRN  ferrous    sulfate 325 milliGRAM(s) Oral daily  glycerin Suppository - Adult 1 Suppository(s) Rectal at bedtime PRN  heparin  Injectable 5000 Unit(s) SubCutaneous every 12 hours  HYDROmorphone  Injectable 1 milliGRAM(s) IV Push once PRN  ibuprofen  Tablet. 600 milliGRAM(s) Oral every 6 hours  lanolin Ointment 1 Application(s) Topical every 3 hours PRN  naloxone Injectable 0.1 milliGRAM(s) IV Push every 3 minutes PRN  ondansetron Injectable 4 milliGRAM(s) IV Push every 6 hours PRN  oxyCODONE    IR 5 milliGRAM(s) Oral every 3 hours PRN  oxyCODONE    IR 10 milliGRAM(s) Oral every 3 hours PRN  oxyCODONE    IR 5 milliGRAM(s) Oral every 3 hours  oxyCODONE    IR 5 milliGRAM(s) Oral every 4 hours PRN  oxytocin Infusion 41.667 milliUNIT(s)/Min IV Continuous <Continuous>  prenatal multivitamin 1 Tablet(s) Oral daily  simethicone 80 milliGRAM(s) Chew every 4 hours PRN  sodium chloride 0.9% lock flush 3 milliLiter(s) IV Push every 8 hours

## 2019-04-08 LAB
BASOPHILS # BLD AUTO: 0.03 K/UL — SIGNIFICANT CHANGE UP (ref 0–0.2)
BASOPHILS NFR BLD AUTO: 0.2 % — SIGNIFICANT CHANGE UP (ref 0–2)
EOSINOPHIL # BLD AUTO: 0.04 K/UL — SIGNIFICANT CHANGE UP (ref 0–0.5)
EOSINOPHIL NFR BLD AUTO: 0.3 % — SIGNIFICANT CHANGE UP (ref 0–6)
HCT VFR BLD CALC: 36.9 % — SIGNIFICANT CHANGE UP (ref 34.5–45)
HGB BLD-MCNC: 11.1 G/DL — LOW (ref 11.5–15.5)
IMM GRANULOCYTES NFR BLD AUTO: 0.6 % — SIGNIFICANT CHANGE UP (ref 0–1.5)
LYMPHOCYTES # BLD AUTO: 1.64 K/UL — SIGNIFICANT CHANGE UP (ref 1–3.3)
LYMPHOCYTES # BLD AUTO: 10.5 % — LOW (ref 13–44)
MCHC RBC-ENTMCNC: 28.8 PG — SIGNIFICANT CHANGE UP (ref 27–34)
MCHC RBC-ENTMCNC: 30.1 % — LOW (ref 32–36)
MCV RBC AUTO: 95.8 FL — SIGNIFICANT CHANGE UP (ref 80–100)
MONOCYTES # BLD AUTO: 0.88 K/UL — SIGNIFICANT CHANGE UP (ref 0–0.9)
MONOCYTES NFR BLD AUTO: 5.7 % — SIGNIFICANT CHANGE UP (ref 2–14)
NEUTROPHILS # BLD AUTO: 12.88 K/UL — HIGH (ref 1.8–7.4)
NEUTROPHILS NFR BLD AUTO: 82.7 % — HIGH (ref 43–77)
NRBC # FLD: 0 K/UL — SIGNIFICANT CHANGE UP (ref 0–0)
PLATELET # BLD AUTO: 247 K/UL — SIGNIFICANT CHANGE UP (ref 150–400)
PMV BLD: 9.8 FL — SIGNIFICANT CHANGE UP (ref 7–13)
RBC # BLD: 3.85 M/UL — SIGNIFICANT CHANGE UP (ref 3.8–5.2)
RBC # FLD: 14.9 % — HIGH (ref 10.3–14.5)
WBC # BLD: 15.56 K/UL — HIGH (ref 3.8–10.5)
WBC # FLD AUTO: 15.56 K/UL — HIGH (ref 3.8–10.5)

## 2019-04-08 PROCEDURE — 74177 CT ABD & PELVIS W/CONTRAST: CPT | Mod: 26

## 2019-04-08 RX ORDER — ACETAMINOPHEN 500 MG
3 TABLET ORAL
Qty: 0 | Refills: 0 | DISCHARGE
Start: 2019-04-08

## 2019-04-08 RX ORDER — INSULIN GLARGINE 100 [IU]/ML
48 INJECTION, SOLUTION SUBCUTANEOUS
Qty: 0 | Refills: 0 | COMMUNITY

## 2019-04-08 RX ORDER — INSULIN LISPRO 100/ML
17 VIAL (ML) SUBCUTANEOUS
Qty: 0 | Refills: 0 | COMMUNITY

## 2019-04-08 RX ORDER — INSULIN LISPRO 100/ML
12 VIAL (ML) SUBCUTANEOUS
Qty: 0 | Refills: 0 | COMMUNITY

## 2019-04-08 RX ORDER — SODIUM CHLORIDE 9 MG/ML
1000 INJECTION, SOLUTION INTRAVENOUS
Qty: 0 | Refills: 0 | Status: DISCONTINUED | OUTPATIENT
Start: 2019-04-08 | End: 2019-04-08

## 2019-04-08 RX ADMIN — SODIUM CHLORIDE 3 MILLILITER(S): 9 INJECTION INTRAMUSCULAR; INTRAVENOUS; SUBCUTANEOUS at 14:06

## 2019-04-08 RX ADMIN — Medication 325 MILLIGRAM(S): at 13:22

## 2019-04-08 RX ADMIN — OXYCODONE HYDROCHLORIDE 5 MILLIGRAM(S): 5 TABLET ORAL at 03:48

## 2019-04-08 RX ADMIN — SIMETHICONE 80 MILLIGRAM(S): 80 TABLET, CHEWABLE ORAL at 10:34

## 2019-04-08 RX ADMIN — Medication 975 MILLIGRAM(S): at 06:03

## 2019-04-08 RX ADMIN — OXYCODONE HYDROCHLORIDE 5 MILLIGRAM(S): 5 TABLET ORAL at 01:15

## 2019-04-08 RX ADMIN — Medication 975 MILLIGRAM(S): at 12:00

## 2019-04-08 RX ADMIN — SODIUM CHLORIDE 3 MILLILITER(S): 9 INJECTION INTRAMUSCULAR; INTRAVENOUS; SUBCUTANEOUS at 22:40

## 2019-04-08 RX ADMIN — HEPARIN SODIUM 5000 UNIT(S): 5000 INJECTION INTRAVENOUS; SUBCUTANEOUS at 13:16

## 2019-04-08 RX ADMIN — Medication 975 MILLIGRAM(S): at 18:22

## 2019-04-08 RX ADMIN — SIMETHICONE 80 MILLIGRAM(S): 80 TABLET, CHEWABLE ORAL at 06:04

## 2019-04-08 RX ADMIN — Medication 100 MILLIGRAM(S): at 10:34

## 2019-04-08 RX ADMIN — Medication 975 MILLIGRAM(S): at 11:10

## 2019-04-08 RX ADMIN — SIMETHICONE 80 MILLIGRAM(S): 80 TABLET, CHEWABLE ORAL at 01:05

## 2019-04-08 NOTE — PROGRESS NOTE ADULT - PROBLEM SELECTOR PLAN 1
- NPO, LR @125  - Increase ambulation.  - Continue motrin, tylenol, oxycodone PRN for pain control.     Juli Gupta PGY1

## 2019-04-08 NOTE — CONSULT NOTE ADULT - SUBJECTIVE AND OBJECTIVE BOX
General Surgery Consult      Consulting surgical team: A  Consulting attending: Dr. Wilder Davis       HPI: Afshan Friedman is a 33 y.o. woman with history of gestational diabetes who is POD2 from  (first delivery) wks (EDC 19 ) c/b post-operative abdominal pain. On POD1, the patient had some upper abdominal pain, which she described as "gas pain." The patient denied any associated fever, chills, nausea, or vomiting. The patient states that, overnight, she was able to walk more and continued passing flatus; this afternoon her pain is significantly improved. The patient states that she feels hungry and thirsty.       PAST MEDICAL HISTORY:  Insulin controlled gestational diabetes mellitus (GDM) in third trimester  Ovarian cyst, right  No pertinent past medical history        PAST SURGICAL HISTORY:  H/O ovarian cystectomy        MEDICATIONS:  Admelog 100 units/mL injectable solution: 12 international unit(s) injectable 2 times a day(break fast and lunch) (2019 20:23)  Admelog 100 units/mL injectable solution: 17  injectable IU once a day(bedtime) (2019 20:23)  Basaglar KwikPen 100 units/mL subcutaneous solution: 48 international unit(s) subcutaneous once a day (at bedtime) (2019 20:23)  citronatal: 2 cap(s) enteral once a day (2019 20:23)  PNV Prenatal oral tablet: 1 tab(s) orally once a day (2019 20:23)      ALLERGIES:  No Known Allergies      VITALS & I/Os:  Vital Signs Last 24 Hrs  T(C): 36.8 (2019 14:09), Max: 37.1 (2019 02:10)  T(F): 98.2 (2019 14:09), Max: 98.7 (2019 02:10)  HR: 95 (2019 14:09) (92 - 106)  BP: 95/62 (2019 14:09) (95/62 - 123/68)  BP(mean): --  RR: 18 (2019 14:09) (18 - 18)  SpO2: 98% (2019 14:09) (97% - 99%)      I&O's Summary  2019 07:01  -  2019 07:00  --------------------------------------------------------  IN: 0 mL / OUT: 850 mL / NET: -850 mL      PHYSICAL EXAM:  General: No acute distress  Respiratory: Nonlabored  Cardiovascular: borderline hypotension, regular rate  Abdominal: Soft, nondistended, mild frank-incisional tenderness, NO RLQ tenderness. No rebound or guarding. No organomegaly, no palpable mass.  Extremities: Warm      LABS:                        11.1   15.56 )-----------( 247      ( 2019 01:40 )             36.9       IMAGING:  CT Abdomen and Pelvis w/ Oral Cont and w/ IV Cont (19 @ 05:39)   LOWER CHEST: Bibasilar subsegmental atelectasis.    LIVER: Within normal limits.  BILE DUCTS: Normal caliber.  GALLBLADDER: Within normal limits.  SPLEEN: Within normal limits.  PANCREAS: Within normal limits.  ADRENALS: Within normal limits.  KIDNEYS/URETERS: Within normal limits.    BLADDER: Within normal limits.  REPRODUCTIVE ORGANS: Enlarged, heterogeneous appearance of the uterus,   with foci of air within the endometrium.    BOWEL: No bowel obstruction. Appendix is dilated to 1 cm and contains   foci of air and fluid. The appendix is thin-walled and without evidence   of adjacent periappendiceal inflammatory change.    PERITONEUM: Small pneumoperitoneum compatible with recent .   Small bladder flap hematoma.  VESSELS:  Retroaortic left renal vein.  RETROPERITONEUM: No lymphadenopathy.    ABDOMINAL WALL: Postoperative changes of the ventral abdominal wall with   foci of subcutaneous emphysema.  BONES: Within normal limits.    IMPRESSION:   Postoperative changes status postcesarean section.  Small bladder flap hematoma. General Surgery Consult      Consulting surgical team: A  Consulting attending: Dr. Diallo      HPI: Afshan Friedman is a 33 y.o. woman with history of gestational diabetes who is POD2 from  (first delivery) wks (EDC 19 ) c/b post-operative abdominal pain. On POD1, the patient had some upper abdominal pain, which she described as "gas pain." The patient denied any associated fever, chills, nausea, or vomiting. The patient states that, overnight, she was able to walk more and continued passing flatus; this afternoon her pain is significantly improved. The patient states that she feels hungry and thirsty.       PAST MEDICAL HISTORY:  Insulin controlled gestational diabetes mellitus (GDM) in third trimester  Ovarian cyst, right  No pertinent past medical history        PAST SURGICAL HISTORY:  H/O ovarian cystectomy        MEDICATIONS:  Admelog 100 units/mL injectable solution: 12 international unit(s) injectable 2 times a day(break fast and lunch) (2019 20:23)  Admelog 100 units/mL injectable solution: 17  injectable IU once a day(bedtime) (2019 20:23)  Basaglar KwikPen 100 units/mL subcutaneous solution: 48 international unit(s) subcutaneous once a day (at bedtime) (2019 20:23)  citronatal: 2 cap(s) enteral once a day (2019 20:23)  PNV Prenatal oral tablet: 1 tab(s) orally once a day (2019 20:23)      ALLERGIES:  No Known Allergies      VITALS & I/Os:  Vital Signs Last 24 Hrs  T(C): 36.8 (2019 14:09), Max: 37.1 (2019 02:10)  T(F): 98.2 (2019 14:09), Max: 98.7 (2019 02:10)  HR: 95 (2019 14:09) (92 - 106)  BP: 95/62 (2019 14:09) (95/62 - 123/68)  BP(mean): --  RR: 18 (2019 14:09) (18 - 18)  SpO2: 98% (2019 14:09) (97% - 99%)      I&O's Summary  2019 07:01  -  2019 07:00  --------------------------------------------------------  IN: 0 mL / OUT: 850 mL / NET: -850 mL      PHYSICAL EXAM:  General: No acute distress  Respiratory: Nonlabored  Cardiovascular: borderline hypotension, regular rate  Abdominal: Soft, nondistended, mild frank-incisional tenderness, NO RLQ tenderness. No rebound or guarding. No organomegaly, no palpable mass.  Extremities: Warm      LABS:                        11.1   15.56 )-----------( 247      ( 2019 01:40 )             36.9       IMAGING:  CT Abdomen and Pelvis w/ Oral Cont and w/ IV Cont (19 @ 05:39)   LOWER CHEST: Bibasilar subsegmental atelectasis.    LIVER: Within normal limits.  BILE DUCTS: Normal caliber.  GALLBLADDER: Within normal limits.  SPLEEN: Within normal limits.  PANCREAS: Within normal limits.  ADRENALS: Within normal limits.  KIDNEYS/URETERS: Within normal limits.    BLADDER: Within normal limits.  REPRODUCTIVE ORGANS: Enlarged, heterogeneous appearance of the uterus,   with foci of air within the endometrium.    BOWEL: No bowel obstruction. Appendix is dilated to 1 cm and contains   foci of air and fluid. The appendix is thin-walled and without evidence   of adjacent periappendiceal inflammatory change.    PERITONEUM: Small pneumoperitoneum compatible with recent .   Small bladder flap hematoma.  VESSELS:  Retroaortic left renal vein.  RETROPERITONEUM: No lymphadenopathy.    ABDOMINAL WALL: Postoperative changes of the ventral abdominal wall with   foci of subcutaneous emphysema.  BONES: Within normal limits.    IMPRESSION:   Postoperative changes status postcesarean section.  Small bladder flap hematoma.

## 2019-04-08 NOTE — PROVIDER CONTACT NOTE (OTHER) - ACTION/TREATMENT ORDERED:
Juli Gupta MD notified and aware.  Encouraged patient to walk, MD will reassess the patient at bed side.
NP at bedside to assess. Ambulation strongly encouraged, STAT CBC, and Maalox ordered. Will continue to monitor.

## 2019-04-08 NOTE — PROVIDER CONTACT NOTE (OTHER) - ASSESSMENT
VSS as per flowsheet, abdomen distended, bowel sounds active. Patient c/o sharp pain in abdomen. Pain managed with PO tylenol and oxycodone. Pt states she has not passed flatus.
patient c/o abdominal pain, states she passed gas this morning and felt better but now the pain has worsened.

## 2019-04-08 NOTE — CONSULT NOTE ADULT - ASSESSMENT
Afshan Friedman is a 33 y.o. woman with history of gestational diabetes who is POD2 from  (first delivery) wks (EDC 19 ) c/b post-operative abdominal pain. On POD1, the patient had some upper abdominal pain, which she described as "gas pain." Significantly improved symptoms today.     Plan:  - Will discuss with Dr. Imani Dawson, PGY2  [id number] Afshan Friedman is a 33 y.o. woman with history of gestational diabetes who is POD2 from  (first delivery) wks (EDC 19 ) c/b post-operative abdominal pain. On POD1, the patient had some upper abdominal pain, which she described as "gas pain." Significantly improved symptoms today.     Plan:  - Given history, physical exam, and negative CT, very low suspicion for acute appendicitis  - No surgical intervention indicated  - Plan discussed with Dr. Imani Dawson, PGY2  [id number]

## 2019-04-08 NOTE — CHART NOTE - NSCHARTNOTEFT_GEN_A_CORE
OB Progress Note: LTCS, POD#2    S: 32yo POD#2 s/p LTCS. Patient continues to have abdominal pain, worsened now from this morning. She was feeling better when she was moving and passing has, but now feels unable to move. Pain is in lower abdomen.     O:  Vitals:  Vital Signs Last 24 Hrs  T(C): 36.8 (08 Apr 2019 09:58), Max: 37.1 (08 Apr 2019 02:10)  T(F): 98.3 (08 Apr 2019 09:58), Max: 98.7 (08 Apr 2019 02:10)  HR: 92 (08 Apr 2019 09:58) (92 - 106)  BP: 108/63 (08 Apr 2019 09:58) (95/57 - 123/68)  BP(mean): --  RR: 18 (08 Apr 2019 09:58) (18 - 18)  SpO2: 98% (08 Apr 2019 09:58) (97% - 99%)    MEDICATIONS  (STANDING):  acetaminophen   Tablet .. 975 milliGRAM(s) Oral every 6 hours  aluminum hydroxide/magnesium hydroxide/simethicone Suspension 30 milliLiter(s) Oral once  diphtheria/tetanus/pertussis (acellular) Vaccine (ADAcel) 0.5 milliLiter(s) IntraMuscular once  ferrous    sulfate 325 milliGRAM(s) Oral daily  heparin  Injectable 5000 Unit(s) SubCutaneous every 12 hours  ibuprofen  Tablet. 600 milliGRAM(s) Oral every 6 hours  lactated ringers. 1000 milliLiter(s) (125 mL/Hr) IV Continuous <Continuous>  oxyCODONE    IR 5 milliGRAM(s) Oral every 3 hours  prenatal multivitamin 1 Tablet(s) Oral daily  sodium chloride 0.9% lock flush 3 milliLiter(s) IV Push every 8 hours      MEDICATIONS  (PRN):  diphenhydrAMINE 25 milliGRAM(s) Oral every 6 hours PRN Itching  docusate sodium 100 milliGRAM(s) Oral two times a day PRN Stool Softening  glycerin Suppository - Adult 1 Suppository(s) Rectal at bedtime PRN Constipation  lanolin Ointment 1 Application(s) Topical every 3 hours PRN Sore Nipples  oxyCODONE    IR 5 milliGRAM(s) Oral every 4 hours PRN Severe Pain (7 - 10)  simethicone 80 milliGRAM(s) Chew every 4 hours PRN Gas      Labs:  Blood type: O Positive  Rubella IgG: Positive (03-10 @ 00:15)  RPR: Negative                          11.1<L>   15.56<H> >-----------< 247    ( 04-08 @ 01:40 )             36.9                        10.6<L>   13.91<H> >-----------< 228    ( 04-07 @ 07:10 )             34.6                        12.1   10.73<H> >-----------< 265    ( 04-06 @ 02:10 )             38.9                  PE:  General: NAD  Abdomen: Soft, TTP RLQ > LLQ, distension unchanged from this am. No rebound or guarding. Faint bowel sounds x4. Incision c/d/i.  Extremities: No erythema, no pitting edema    CT scan overnight showed postoperative changes, small bladder flap hematoma, and appendix dilated to 1cm.    A/P: 32yo POD#2 s/p LTCS with severe lower abdominal pain, R>L. Pain etiology either from dilated appendix vs gas.  - general surgery paged for consult per attending Dr. Estrada request (for Dr. Diallo)  - NPO, LR@125  - Increase ambulation.  - Continue motrin, tylenol, hold oxycodone for now    Juli Gupta PGY1  D/w Dr. Estrada and Dr. Kendall (chief resident)

## 2019-04-08 NOTE — PROGRESS NOTE ADULT - SUBJECTIVE AND OBJECTIVE BOX
OB Progress Note: LTCS, POD#2    S: 34yo POD#2 s/p LTCS. Pain is improved from overnight. She is NPO at this time. Now passing more flatus than yesterday. She is voiding spontaneously, and ambulating without difficulty. Denies CP/SOB. Denies lightheadedness/dizziness. Denies N/V.    O:  Vitals:  Vital Signs Last 24 Hrs  T(C): 36.6 (08 Apr 2019 05:39), Max: 37.1 (08 Apr 2019 02:10)  T(F): 97.8 (08 Apr 2019 05:39), Max: 98.7 (08 Apr 2019 02:10)  HR: 102 (08 Apr 2019 05:39) (98 - 106)  BP: 123/68 (08 Apr 2019 05:39) (95/57 - 123/68)  BP(mean): --  RR: 18 (08 Apr 2019 05:39) (18 - 18)  SpO2: 99% (08 Apr 2019 05:39) (97% - 99%)    MEDICATIONS  (STANDING):  acetaminophen   Tablet .. 975 milliGRAM(s) Oral every 6 hours  aluminum hydroxide/magnesium hydroxide/simethicone Suspension 30 milliLiter(s) Oral once  diphtheria/tetanus/pertussis (acellular) Vaccine (ADAcel) 0.5 milliLiter(s) IntraMuscular once  ferrous    sulfate 325 milliGRAM(s) Oral daily  heparin  Injectable 5000 Unit(s) SubCutaneous every 12 hours  ibuprofen  Tablet. 600 milliGRAM(s) Oral every 6 hours  lactated ringers. 1000 milliLiter(s) (125 mL/Hr) IV Continuous <Continuous>  oxyCODONE    IR 5 milliGRAM(s) Oral every 3 hours  prenatal multivitamin 1 Tablet(s) Oral daily  sodium chloride 0.9% lock flush 3 milliLiter(s) IV Push every 8 hours      MEDICATIONS  (PRN):  diphenhydrAMINE 25 milliGRAM(s) Oral every 6 hours PRN Itching  docusate sodium 100 milliGRAM(s) Oral two times a day PRN Stool Softening  glycerin Suppository - Adult 1 Suppository(s) Rectal at bedtime PRN Constipation  lanolin Ointment 1 Application(s) Topical every 3 hours PRN Sore Nipples  oxyCODONE    IR 5 milliGRAM(s) Oral every 4 hours PRN Severe Pain (7 - 10)  simethicone 80 milliGRAM(s) Chew every 4 hours PRN Gas      Labs:  Blood type: O Positive  Rubella IgG: Positive (03-10 @ 00:15)  RPR: Negative                          11.1<L>   15.56<H> >-----------< 247    ( 04-08 @ 01:40 )             36.9                        10.6<L>   13.91<H> >-----------< 228    ( 04-07 @ 07:10 )             34.6                        12.1   10.73<H> >-----------< 265    ( 04-06 @ 02:10 )             38.9                  PE:  General: NAD  Abdomen: Soft, appropriately tender, incision c/d/i.  Extremities: No erythema, no pitting edema

## 2019-04-08 NOTE — PROGRESS NOTE ADULT - ASSESSMENT
A/P: 32yo POD#2 s/p LTCS. Pt evaluated overnight for abdominal pain. CT showed pneumoperitoneum consistent with postop changes, mildly dilated appendix with air foci, and small bladder flap hematoma.  Patient is stable and doing well post-operatively.

## 2019-04-08 NOTE — CONSULT NOTE ADULT - ATTENDING COMMENTS
Above noted. History reviewed, the patient was examined. She is POD 2 from a C- section. Developed epigastric pain last night, that persisted until this morning and decreased markedly after she passed flatus this afternoon.  Physical Exam: Afebrile.  Abdomen: Soft, minimal tenderness, incision is clean.  Impression: Post  ileus.  Plan: Clear liquid diet.

## 2019-04-09 RX ADMIN — Medication 975 MILLIGRAM(S): at 13:15

## 2019-04-09 RX ADMIN — Medication 975 MILLIGRAM(S): at 23:57

## 2019-04-09 RX ADMIN — SIMETHICONE 80 MILLIGRAM(S): 80 TABLET, CHEWABLE ORAL at 23:58

## 2019-04-09 RX ADMIN — SIMETHICONE 80 MILLIGRAM(S): 80 TABLET, CHEWABLE ORAL at 00:52

## 2019-04-09 RX ADMIN — Medication 975 MILLIGRAM(S): at 18:25

## 2019-04-09 RX ADMIN — Medication 325 MILLIGRAM(S): at 12:16

## 2019-04-09 RX ADMIN — Medication 975 MILLIGRAM(S): at 12:16

## 2019-04-09 RX ADMIN — Medication 975 MILLIGRAM(S): at 07:30

## 2019-04-09 RX ADMIN — Medication 975 MILLIGRAM(S): at 01:51

## 2019-04-09 RX ADMIN — Medication 975 MILLIGRAM(S): at 06:29

## 2019-04-09 RX ADMIN — SIMETHICONE 80 MILLIGRAM(S): 80 TABLET, CHEWABLE ORAL at 12:16

## 2019-04-09 RX ADMIN — Medication 975 MILLIGRAM(S): at 00:51

## 2019-04-09 RX ADMIN — Medication 1 TABLET(S): at 12:16

## 2019-04-09 RX ADMIN — HEPARIN SODIUM 5000 UNIT(S): 5000 INJECTION INTRAVENOUS; SUBCUTANEOUS at 18:25

## 2019-04-09 RX ADMIN — HEPARIN SODIUM 5000 UNIT(S): 5000 INJECTION INTRAVENOUS; SUBCUTANEOUS at 00:53

## 2019-04-09 RX ADMIN — SODIUM CHLORIDE 3 MILLILITER(S): 9 INJECTION INTRAMUSCULAR; INTRAVENOUS; SUBCUTANEOUS at 06:31

## 2019-04-10 VITALS
HEART RATE: 90 BPM | SYSTOLIC BLOOD PRESSURE: 107 MMHG | OXYGEN SATURATION: 99 % | DIASTOLIC BLOOD PRESSURE: 65 MMHG | TEMPERATURE: 98 F

## 2019-04-10 RX ADMIN — Medication 975 MILLIGRAM(S): at 06:02

## 2019-04-10 RX ADMIN — Medication 975 MILLIGRAM(S): at 18:05

## 2019-04-10 RX ADMIN — HEPARIN SODIUM 5000 UNIT(S): 5000 INJECTION INTRAVENOUS; SUBCUTANEOUS at 06:03

## 2019-04-10 RX ADMIN — Medication 975 MILLIGRAM(S): at 13:14

## 2019-04-10 RX ADMIN — Medication 975 MILLIGRAM(S): at 13:25

## 2019-04-10 RX ADMIN — SIMETHICONE 80 MILLIGRAM(S): 80 TABLET, CHEWABLE ORAL at 06:03

## 2019-04-10 RX ADMIN — Medication 975 MILLIGRAM(S): at 00:28

## 2019-04-10 RX ADMIN — Medication 975 MILLIGRAM(S): at 06:34

## 2019-04-10 RX ADMIN — SODIUM CHLORIDE 3 MILLILITER(S): 9 INJECTION INTRAMUSCULAR; INTRAVENOUS; SUBCUTANEOUS at 13:25

## 2019-04-10 NOTE — PROGRESS NOTE ADULT - SUBJECTIVE AND OBJECTIVE BOX
SUBJECTIVE:    Pain: Controlled    Complaints: None    MILESTONES:    Alert and Oriented x 3  [ x ]  Out of bed/ ambulating. [ x ]  Flatus:   Positive [ x ]  Negative [  ]  Bowel movement  [ X ] Positive [  ] Negative   Voiding [x  ] Due to void [  ]   Sams/Indwelling catheter in place [  ]  Diet: Regular [ x ]  Clears [  ]  NPO [  ]    Infant feeding:  Breast [  ]   Bottle [  ]  Both [ X ]  Feeding related issues and/or concerns:      OBJECTIVE:  T(C): 36.6 (04-10-19 @ 06:07), Max: 36.6 (19 @ 16:00)  HR: 84 (04-10-19 @ 06:07) (77 - 88)  BP: 109/67 (04-10-19 @ 06:07) (100/72 - 110/67)  RR: 16 (04-10-19 @ 06:07) (16 - 16)  SpO2: 100% (04-10-19 @ 06:07) (99% - 100%)  Wt(kg): --          Blood Type: O Positive    RPR: Negative          MEDICATIONS  (STANDING):  acetaminophen   Tablet .. 975 milliGRAM(s) Oral every 6 hours  aluminum hydroxide/magnesium hydroxide/simethicone Suspension 30 milliLiter(s) Oral once  diphtheria/tetanus/pertussis (acellular) Vaccine (ADAcel) 0.5 milliLiter(s) IntraMuscular once  ferrous    sulfate 325 milliGRAM(s) Oral daily  heparin  Injectable 5000 Unit(s) SubCutaneous every 12 hours  prenatal multivitamin 1 Tablet(s) Oral daily  sodium chloride 0.9% lock flush 3 milliLiter(s) IV Push every 8 hours    MEDICATIONS  (PRN):  diphenhydrAMINE 25 milliGRAM(s) Oral every 6 hours PRN Itching  docusate sodium 100 milliGRAM(s) Oral two times a day PRN Stool Softening  glycerin Suppository - Adult 1 Suppository(s) Rectal at bedtime PRN Constipation  lanolin Ointment 1 Application(s) Topical every 3 hours PRN Sore Nipples  oxyCODONE    IR 5 milliGRAM(s) Oral every 4 hours PRN Severe Pain (7 - 10)  simethicone 80 milliGRAM(s) Chew every 4 hours PRN Gas        ASSESSMENT:    33y     G   1   P    1001     PO Day#  3        Delivery: Primary [X  ]    Repeat [  ]       EBL - 1200, 2nd to [facility], s/p Hemabate and Methergine IM                                  Indication of procedure: Arrest    Condition: Stable    Past Medical History significant for: HPI:  33 year old female P0 at 36.6 wks (EDC 19 ) who presents with contrxs stronger since 2p stated  q 5 min  denied any rom lof vb feels gfm  pt was seen earlier and returns from ambulation feels more contrxs and back pains    Pt is GDMA2 on insulin  stated fs well controlled  with insulin   stated   EFW 5.5# as of 3/11/19   GBS  done   results unknown (2019 02:09)      Current Issues:    Breasts:  Soft [x  ]   Engorged [  ]  Nipples:  Abdomen: Soft [ x ]   Distended [  ] Nontender [  ]     Bowel sounds :  Present [  ]  Absent [  ]   Fundus:  Firm [x  ]  Boggy [  ]    Abdominal incision: Clean, Dry and Intact [x  ]  Staples [  ] Steri Strips [  ] Dermabond [X  ] Sutures [  ]    Patient wearing abdominal binder for support.    Vaginal: Lochia:  Heavy [  ]  Moderate [ x ]   Scant [  ]    Extremities: Edema [ X ] Negative Vinny's Sign [ X ] Nontender Diego  [ x ] Positive pedal pulses [  ]    Other relevant physical exam findings:      PLAN:    Plan: Increase ambulation, analgesia PRN and pain medication protocol standing oxycodone, ibuprofen and acetaminophen.    Diet: Regular diet    Diabetes - For Repeat Glucose Testing in 6 Weeks.    Continue routine post-operative and postpartum care.  C/O Severe abdominal Pain on PO day # 2, R/O Appendicitis,  CTPA was done.  General Surgery - -> Most likely gas.  Has had a BM x 2 since. Feeling much better. Pain resolved.    Discharge Planning [ x ]    For discharge Today  [  X  ]    Consults:  Social Work [  ]  Lactation [ x ]  Other [         ] SUBJECTIVE:    Pain: Controlled    Complaints: None    MILESTONES:    Alert and Oriented x 3  [ x ]  Out of bed/ ambulating. [ x ]  Flatus:   Positive [ x ]  Negative [  ]  Bowel movement  [ X ] Positive [  ] Negative   Voiding [x  ] Due to void [  ]   Sams/Indwelling catheter in place [  ]  Diet: Regular [ x ]  Clears [  ]  NPO [  ]    Infant feeding:  Breast [  ]   Bottle [  ]  Both [ X ]  Feeding related issues and/or concerns:      OBJECTIVE:  T(C): 36.6 (04-10-19 @ 06:07), Max: 36.6 (19 @ 16:00)  HR: 84 (04-10-19 @ 06:07) (77 - 88)  BP: 109/67 (04-10-19 @ 06:07) (100/72 - 110/67)  RR: 16 (04-10-19 @ 06:07) (16 - 16)  SpO2: 100% (04-10-19 @ 06:07) (99% - 100%)  Wt(kg): --          Blood Type: O Positive    RPR: Negative          MEDICATIONS  (STANDING):  acetaminophen   Tablet .. 975 milliGRAM(s) Oral every 6 hours  aluminum hydroxide/magnesium hydroxide/simethicone Suspension 30 milliLiter(s) Oral once  diphtheria/tetanus/pertussis (acellular) Vaccine (ADAcel) 0.5 milliLiter(s) IntraMuscular once  ferrous    sulfate 325 milliGRAM(s) Oral daily  heparin  Injectable 5000 Unit(s) SubCutaneous every 12 hours  prenatal multivitamin 1 Tablet(s) Oral daily  sodium chloride 0.9% lock flush 3 milliLiter(s) IV Push every 8 hours    MEDICATIONS  (PRN):  diphenhydrAMINE 25 milliGRAM(s) Oral every 6 hours PRN Itching  docusate sodium 100 milliGRAM(s) Oral two times a day PRN Stool Softening  glycerin Suppository - Adult 1 Suppository(s) Rectal at bedtime PRN Constipation  lanolin Ointment 1 Application(s) Topical every 3 hours PRN Sore Nipples  oxyCODONE    IR 5 milliGRAM(s) Oral every 4 hours PRN Severe Pain (7 - 10)  simethicone 80 milliGRAM(s) Chew every 4 hours PRN Gas        ASSESSMENT:    33y     G   1   P    1001     PO Day#  4        Delivery: Primary [X  ]    Repeat [  ]       EBL - 1200, 2nd to [facility], s/p Hemabate and Methergine IM                                  Indication of procedure: Arrest    Condition: Stable    Past Medical History significant for: HPI:  33 year old female P0 at 36.6 wks (EDC 19 ) who presents with contrxs stronger since 2p stated  q 5 min  denied any rom lof vb feels gfm  pt was seen earlier and returns from ambulation feels more contrxs and back pains    Pt is GDMA2 on insulin  stated fs well controlled  with insulin   stated   EFW 5.5# as of 3/11/19   GBS  done   results unknown (2019 02:09)      Current Issues:    Breasts:  Soft [x  ]   Engorged [  ]  Nipples:  Abdomen: Soft [ x ]   Distended [  ] Nontender [  ]     Bowel sounds :  Present [  ]  Absent [  ]   Fundus:  Firm [x  ]  Boggy [  ]    Abdominal incision: Clean, Dry and Intact [x  ]  Staples [  ] Steri Strips [  ] Dermabond [X  ] Sutures [  ]    Patient wearing abdominal binder for support.    Vaginal: Lochia:  Heavy [  ]  Moderate [ x ]   Scant [  ]    Extremities: Edema [ X ] Negative Vinny's Sign [ X ] Nontender Diego  [ x ] Positive pedal pulses [  ]    Other relevant physical exam findings:      PLAN:    Plan: Increase ambulation, analgesia PRN and pain medication protocol standing oxycodone, ibuprofen and acetaminophen.    Diet: Regular diet    Diabetes - For Repeat Glucose Testing in 6 Weeks.    Continue routine post-operative and postpartum care.  C/O Severe abdominal Pain on PO day # 2, R/O Appendicitis,  CTPA was done.  General Surgery - -> Most likely gas.  Has had a BM x 2 since. Feeling much better. Pain resolved.    Discharge Planning [ x ]    For discharge Today  [  X  ]    Consults:  Social Work [  ]  Lactation [ x ]  Other [         ]

## 2020-05-12 NOTE — OB RN DELIVERY SUMMARY - NS_NEWBORNRN2_OBGYN_ALL_OB_FT
Madhuri Poe, is a 83 year old female who presents today for multiple concerns, she tells me that she has history of urinary incontinence, she has had 3 bladder repair surgeries in the past, she tells me that for the past 2-3 weeks she has been having increased urinary frequency, she tells me that sometimes the urine just flows out of her and sometimes she has no control over her urine, she is using sanitary pads, she is going through them rather quickly.  Patient occasionally has urinary urgency but cannot make it to the bathroom on time.  There has been no burning reported, occasionally there is some lower abdominal cramping.  No fever or chills are reported.  Denies any back pain, no numbness of the legs, strength in the leg is normal, she walks with a walker.    Her other concern is that she is not able to sleep at nighttime, she tells me that she goes to bed and wakes up and 2 hours and then she is awake all night, she does take frequent naps during the daytime.  She is frustrated and tired and fatigued during the daytime as she does not get a good night's sleep.  She is asking for something to help worse calmed down and sleep.    She also has history of depression, she is on escitalopram 10 mg daily, she tells me that because of COVID-19 situation and restrictions, she is not able to go out and play cards with her friends etcetera, she is feeling more depressed.    Current Outpatient Medications   Medication Sig   • pioglitazone (ACTOS) 15 MG tablet Take 1 tablet by mouth daily.   • escitalopram (LEXAPRO) 20 MG tablet Take 1 tablet by mouth daily.   • atorvastatin (LIPITOR) 40 MG tablet Take 1 tablet by mouth daily.   • buPROPion (WELLBUTRIN SR) 150 MG 12 hr tablet Take 1 tablet by mouth daily.   • Calcium Carb-Cholecalciferol (CALCIUM-VITAMIN D) 600-400 MG-UNIT Tab Take 1 tablet by mouth 2 times daily. OTC medication started 11/6/2019   • potassium chloride (KLOR-CON M) 10 MEQ vickie ER tablet Take 1  tablet by mouth daily.   • timolol (TIMOPTIC) 0.5 % ophthalmic solution Place 1 drop into both eyes 2 times daily.   • latanoprost (XALATAN) 0.005 % ophthalmic solution Place 1 drop into both eyes nightly.   • glipiZIDE (GLUCOTROL) 5 MG tablet Take 1 tablet (5 mg) in morning and 1/2 tablet (2.5 mg) in evening, dose increase 3/28/2018   • Multiple Vitamin (MULTIVITAMIN) capsule Take 1 capsule by mouth daily.   • lisinopril (ZESTRIL) 20 MG tablet TAKE 1 TABLET EVERY DAY   • ACCU-CHEK SMARTVIEW test strip TEST BLOOD SUGAR ONCE DAILY   • Loperamide HCl (IMODIUM A-D PO) Take 1 tablet by mouth nightly as needed.    • ACCU-CHEK FASTCLIX LANCETS MISC 1 each by Other route daily. 90 day supply   • mirabegron ER (MYRBETRIQ) 25 MG 24 hour tablet Take 1 tablet by mouth daily.   • LORazepam (ATIVAN) 0.5 MG tablet Take 1 tablet by mouth at bedtime as needed (Insomnia).   • furosemide (LASIX) 40 MG tablet Take 1 tablet by mouth daily.   • amoxicillin (AMOXIL) 500 MG tablet Take 4 tablets one hour prior to dental procedure.   • triamcinolone (ARISTOCORT) 0.1 % ointment Apply 1 application topically 2 times daily.   • Inulin (FIBERCHOICE) 2 GM CHEW Chew by mouth daily as needed.    • ARTIFICIAL TEARS OP SOLN 1-2 gtt's ou PRN (Patient taking differently: Instill 1-2 drops in both eyes as needed)     No current facility-administered medications for this visit.        ALLERGIES:   Allergen Reactions   • Tubocurarine CARDIAC DISTURBANCES     heart stopped   • Metformin Other (See Comments)     Sweating   • Prochlorperazine Maleate Other (See Comments)     (Compazine)  eyes rolled back and tongue hanged out       Patient Active Problem List   Diagnosis   • TROCHANTERIC [ENTHESOPATHY OF HIP]   • Depressive disorder, not elsewhere classified   • Nocturnal enuresis   • Dyskinesia of esophagus   • FIBROMYALGIA   • Irritable bowel syndrome   • FIBROCYSTIC(aka DIFFUS CYSTIC MASTOPATHY)   • Memory loss   • Sprain and strain of unspecified site  of knee and leg   • Chronic kidney disease, stage III (moderate) (CMS/[facility])   • Borderline glaucoma with ocular hypertension   • Abnormality of gait   • Other and unspecified hyperlipidemia   • Esophageal reflux   • Essential hypertension   • Kidney mass   • Type II or unspecified type diabetes mellitus without mention of complication, not stated as uncontrolled   • Diabetes mellitus with stage 3 chronic kidney disease (CMS/[facility])   • Primary osteoarthritis of both knees   • Blepharitis   • Incontinence of feces   • Arthritis of right knee   • Body mass index (BMI) 40.0-44.9, adult (CMS/[facility])       REVIEW OF SYSTEMS    Constitutional:  Patient denies fever, chills or tiredness.  Eyes:  Denies change in visual acuity, pain, burning or itching.  HENT:  Denies sinus problems, ear infections, nasal congestion or sore throat.  Respiratory:  Denies cough, shortness of breath.   Cardiovascular:  Denies chest pain, edema.  Gastrointestinal:  Denies abdominal pain, nausea, vomiting.  Genitourinary:  Increased urinary incontinence as mentioned above, no burning..  Musculoskeletal:  Denies back pain, neck pain.  Integument:  Denies rash or itching.      PHYSICAL EXAM  Vital Signs:    Visit Vitals  /76   Pulse 82   Resp (!) 22   Wt 130.6 kg   SpO2 94%   BMI 46.47 kg/m²     Constitutional:  Negative for fever and fatigue.  Integument:  Warm.  Dry.  No rash.  HENT:  Normocephalic, atraumatic.  Bilateral external ears normal.  Neck:  Normal range of motion.  No tenderness.  Supple.  No thyromegaly.  Eyes:  PERRL, EOMI.  Conjunctivae normal.  No discharge.  Cardiovascular:  S1, S2.  Normal rhythm.  No murmurs.  Plus one pretibial pitting edema bilateral lower extremity.  Respiratory:  Clear to auscultation.  No respiratory distress.    Gastrointestinal:  Bowel sounds normal.  Soft.  No tenderness.   Neurologic:  Alert and oriented x 3.  Normal recent and remote memory.    ASSESSMENT AND PLAN    Worsening of urinary incontinence,  it seems that patient has no control, occasionally she has urinary urgency but cannot make it to the bathroom on time.  She has had 3 bladder surgeries in the past.  I will start the workup by ordering a UA as I suspect that patient might have a low-grade urinary tract infection, will also start her on Myrbetriq 25 mg daily, I have advised her to give me a call in the next couple weeks.  Potential side effects were explained.  She was advised to check with her insurance to see if her insurance would cover the sanitary pad/diapers.    Regarding her anxiety and insomnia, this is bothering her the most, she is exhausted in the morning as she is not sleeping well.  I will start her on lorazepam 0.5 mg to be taken only on as-needed basis at nighttime, I have told her this medication can cause excessive sedation.  Patient also is taking melatonin which is not helping.      Positive weight gain, patient has gained water weight as well as she has not been able to take her diuretics in the last 2-3 more weeks because of the loss of urinary control.  She does have signs of volume overload.  She will let me know in 1 week, if her urinary incontinence gets better than Lasix could be resumed.       Fercho JUÁREZ

## 2020-11-18 NOTE — ED PROVIDER NOTE - PMH
no rashes , no suspicious lesions , no areas of discoloration , no jaundice present , good turgor , no masses , no tenderness on palpation Ovarian cyst, right

## 2021-01-25 PROBLEM — O24.414 GESTATIONAL DIABETES MELLITUS IN PREGNANCY, INSULIN CONTROLLED: Chronic | Status: ACTIVE | Noted: 2019-04-05

## 2021-01-28 ENCOUNTER — APPOINTMENT (OUTPATIENT)
Dept: OBGYN | Facility: CLINIC | Age: 36
End: 2021-01-28
Payer: MEDICAID

## 2021-01-28 PROCEDURE — 36415 COLL VENOUS BLD VENIPUNCTURE: CPT

## 2021-01-28 PROCEDURE — 99395 PREV VISIT EST AGE 18-39: CPT

## 2021-01-28 PROCEDURE — 81025 URINE PREGNANCY TEST: CPT

## 2021-01-28 PROCEDURE — 99072 ADDL SUPL MATRL&STAF TM PHE: CPT

## 2021-02-11 ENCOUNTER — APPOINTMENT (OUTPATIENT)
Dept: ANTEPARTUM | Facility: CLINIC | Age: 36
End: 2021-02-11
Payer: MEDICAID

## 2021-02-11 PROCEDURE — 99072 ADDL SUPL MATRL&STAF TM PHE: CPT

## 2021-02-11 PROCEDURE — 76805 OB US >/= 14 WKS SNGL FETUS: CPT

## 2021-02-25 ENCOUNTER — APPOINTMENT (OUTPATIENT)
Dept: OBGYN | Facility: CLINIC | Age: 36
End: 2021-02-25
Payer: MEDICAID

## 2021-02-25 PROCEDURE — 76817 TRANSVAGINAL US OBSTETRIC: CPT

## 2021-02-25 PROCEDURE — 76811 OB US DETAILED SNGL FETUS: CPT

## 2021-02-25 PROCEDURE — 99072 ADDL SUPL MATRL&STAF TM PHE: CPT

## 2021-03-04 ENCOUNTER — APPOINTMENT (OUTPATIENT)
Dept: ANTEPARTUM | Facility: CLINIC | Age: 36
End: 2021-03-04

## 2021-03-25 ENCOUNTER — APPOINTMENT (OUTPATIENT)
Dept: OBGYN | Facility: CLINIC | Age: 36
End: 2021-03-25
Payer: MEDICAID

## 2021-03-25 PROCEDURE — 0502F SUBSEQUENT PRENATAL CARE: CPT

## 2021-04-05 ENCOUNTER — APPOINTMENT (OUTPATIENT)
Dept: ANTEPARTUM | Facility: CLINIC | Age: 36
End: 2021-04-05
Payer: MEDICAID

## 2021-04-05 ENCOUNTER — APPOINTMENT (OUTPATIENT)
Dept: OBGYN | Facility: CLINIC | Age: 36
End: 2021-04-05

## 2021-04-05 PROCEDURE — 99072 ADDL SUPL MATRL&STAF TM PHE: CPT

## 2021-04-05 PROCEDURE — 76816 OB US FOLLOW-UP PER FETUS: CPT

## 2021-04-05 PROCEDURE — 76817 TRANSVAGINAL US OBSTETRIC: CPT

## 2021-04-26 ENCOUNTER — APPOINTMENT (OUTPATIENT)
Dept: ANTEPARTUM | Facility: CLINIC | Age: 36
End: 2021-04-26
Payer: MEDICAID

## 2021-04-26 ENCOUNTER — TRANSCRIPTION ENCOUNTER (OUTPATIENT)
Age: 36
End: 2021-04-26

## 2021-04-26 PROCEDURE — 99072 ADDL SUPL MATRL&STAF TM PHE: CPT

## 2021-04-26 PROCEDURE — 76816 OB US FOLLOW-UP PER FETUS: CPT

## 2021-04-26 PROCEDURE — 76819 FETAL BIOPHYS PROFIL W/O NST: CPT

## 2021-05-12 ENCOUNTER — APPOINTMENT (OUTPATIENT)
Dept: OBGYN | Facility: CLINIC | Age: 36
End: 2021-05-12
Payer: MEDICAID

## 2021-05-12 ENCOUNTER — NON-APPOINTMENT (OUTPATIENT)
Age: 36
End: 2021-05-12

## 2021-05-12 PROCEDURE — 0502F SUBSEQUENT PRENATAL CARE: CPT

## 2021-05-27 ENCOUNTER — APPOINTMENT (OUTPATIENT)
Dept: OBGYN | Facility: CLINIC | Age: 36
End: 2021-05-27

## 2021-06-10 ENCOUNTER — APPOINTMENT (OUTPATIENT)
Dept: OBGYN | Facility: CLINIC | Age: 36
End: 2021-06-10
Payer: MEDICAID

## 2021-06-10 PROCEDURE — 0502F SUBSEQUENT PRENATAL CARE: CPT

## 2021-07-01 ENCOUNTER — APPOINTMENT (OUTPATIENT)
Dept: ANTEPARTUM | Facility: CLINIC | Age: 36
End: 2021-07-01
Payer: MEDICAID

## 2021-07-01 PROCEDURE — 76817 TRANSVAGINAL US OBSTETRIC: CPT

## 2021-07-01 PROCEDURE — 76819 FETAL BIOPHYS PROFIL W/O NST: CPT

## 2021-07-06 ENCOUNTER — APPOINTMENT (OUTPATIENT)
Dept: OBGYN | Facility: CLINIC | Age: 36
End: 2021-07-06
Payer: MEDICAID

## 2021-07-06 PROCEDURE — 0502F SUBSEQUENT PRENATAL CARE: CPT

## 2021-07-09 ENCOUNTER — OUTPATIENT (OUTPATIENT)
Dept: OUTPATIENT SERVICES | Facility: HOSPITAL | Age: 36
LOS: 1 days | End: 2021-07-09
Payer: MEDICAID

## 2021-07-09 VITALS
HEART RATE: 87 BPM | RESPIRATION RATE: 16 BRPM | WEIGHT: 220.9 LBS | DIASTOLIC BLOOD PRESSURE: 70 MMHG | HEIGHT: 66 IN | TEMPERATURE: 97 F | OXYGEN SATURATION: 98 % | SYSTOLIC BLOOD PRESSURE: 110 MMHG

## 2021-07-09 DIAGNOSIS — O34.211 MATERNAL CARE FOR LOW TRANSVERSE SCAR FROM PREVIOUS CESAREAN DELIVERY: ICD-10-CM

## 2021-07-09 DIAGNOSIS — Z98.890 OTHER SPECIFIED POSTPROCEDURAL STATES: Chronic | ICD-10-CM

## 2021-07-09 DIAGNOSIS — Z01.818 ENCOUNTER FOR OTHER PREPROCEDURAL EXAMINATION: ICD-10-CM

## 2021-07-09 DIAGNOSIS — Z98.891 HISTORY OF UTERINE SCAR FROM PREVIOUS SURGERY: Chronic | ICD-10-CM

## 2021-07-09 LAB
ANION GAP SERPL CALC-SCNC: 15 MMOL/L — HIGH (ref 7–14)
APPEARANCE UR: ABNORMAL
BILIRUB UR-MCNC: NEGATIVE — SIGNIFICANT CHANGE UP
BLD GP AB SCN SERPL QL: NEGATIVE — SIGNIFICANT CHANGE UP
BUN SERPL-MCNC: 8 MG/DL — SIGNIFICANT CHANGE UP (ref 7–23)
CALCIUM SERPL-MCNC: 9.1 MG/DL — SIGNIFICANT CHANGE UP (ref 8.4–10.5)
CHLORIDE SERPL-SCNC: 104 MMOL/L — SIGNIFICANT CHANGE UP (ref 98–107)
CO2 SERPL-SCNC: 17 MMOL/L — LOW (ref 22–31)
COLOR SPEC: COLORLESS — SIGNIFICANT CHANGE UP
CREAT SERPL-MCNC: 0.64 MG/DL — SIGNIFICANT CHANGE UP (ref 0.5–1.3)
DIFF PNL FLD: NEGATIVE — SIGNIFICANT CHANGE UP
GLUCOSE SERPL-MCNC: 71 MG/DL — SIGNIFICANT CHANGE UP (ref 70–99)
GLUCOSE UR QL: NEGATIVE — SIGNIFICANT CHANGE UP
HCT VFR BLD CALC: 42.2 % — SIGNIFICANT CHANGE UP (ref 34.5–45)
HGB BLD-MCNC: 13.2 G/DL — SIGNIFICANT CHANGE UP (ref 11.5–15.5)
KETONES UR-MCNC: NEGATIVE — SIGNIFICANT CHANGE UP
LEUKOCYTE ESTERASE UR-ACNC: ABNORMAL
MCHC RBC-ENTMCNC: 28.2 PG — SIGNIFICANT CHANGE UP (ref 27–34)
MCHC RBC-ENTMCNC: 31.3 GM/DL — LOW (ref 32–36)
MCV RBC AUTO: 90.2 FL — SIGNIFICANT CHANGE UP (ref 80–100)
NITRITE UR-MCNC: NEGATIVE — SIGNIFICANT CHANGE UP
NRBC # BLD: 0 /100 WBCS — SIGNIFICANT CHANGE UP
NRBC # FLD: 0 K/UL — SIGNIFICANT CHANGE UP
PH UR: 6.5 — SIGNIFICANT CHANGE UP (ref 5–8)
PLATELET # BLD AUTO: 237 K/UL — SIGNIFICANT CHANGE UP (ref 150–400)
POTASSIUM SERPL-MCNC: 4.2 MMOL/L — SIGNIFICANT CHANGE UP (ref 3.5–5.3)
POTASSIUM SERPL-SCNC: 4.2 MMOL/L — SIGNIFICANT CHANGE UP (ref 3.5–5.3)
PROT UR-MCNC: NEGATIVE — SIGNIFICANT CHANGE UP
RBC # BLD: 4.68 M/UL — SIGNIFICANT CHANGE UP (ref 3.8–5.2)
RBC # FLD: 14.9 % — HIGH (ref 10.3–14.5)
RH IG SCN BLD-IMP: POSITIVE — SIGNIFICANT CHANGE UP
SODIUM SERPL-SCNC: 136 MMOL/L — SIGNIFICANT CHANGE UP (ref 135–145)
SP GR SPEC: 1 — LOW (ref 1.01–1.02)
UROBILINOGEN FLD QL: SIGNIFICANT CHANGE UP
WBC # BLD: 8.28 K/UL — SIGNIFICANT CHANGE UP (ref 3.8–10.5)
WBC # FLD AUTO: 8.28 K/UL — SIGNIFICANT CHANGE UP (ref 3.8–10.5)

## 2021-07-09 NOTE — OB PST NOTE - NSHPREVIEWOFSYSTEMS_GEN_ALL_CORE
General: No fever, chills, sweating, anorexia, weight loss or weight gain. No polyphagia, polyuria, polydipsia, malaise, or fatigue    Skin: No rashes, itching, or dryness. No change in size/color of moles. No tumors, brittle nails, pitted nails, or hair loss    Breast: No tenderness, lumps, or nipple discharge      Ophthalmologic: No diplopia, photophobia, lacrimation, blurred Vision , or eye discharge    ENMT Symptoms: No hearing difficulty, ear pain, tinnitus, or vertigo. No sinus symptoms, nasal congestion, nasal   discharge, or nasal obstruction    Respiratory and Thorax: No wheezing, dyspnea, cough, hemoptysis, or pleuritic chest pain     Cardiovascular: No chest pain, palpitations, dyspnea on exertion, orthopnea, paroxysmal nocturnal dyspnea,   peripheral edema, or claudication    Gastrointestinal: No nausea, vomiting, diarrhea, constipation, change in bowel habits, flatulence, abdominal pain, or melena    Genitourinary/ Pelvis: No hematuria, renal colic, or flank pain.  No urine discoloration, incontinence, dysuria, or urinary hesitancy. Normal urinary frequency. No nocturia, abnormal vaginal bleeding, vaginal discharge, spotting, pelvic pain, or vaginal leakage    Musculoskeletal: intermittent low back pain, localized, No arthralgia, arthritis, joint swelling, muscle cramping, muscle weakness, neck pain, arm pain, or leg pain    Neurological: No transient paralysis, weakness, paresthesias, or seizures. No syncope, tremors, vertigo, loss of sensation, difficulty walking, loss of consciousness, hemiparesis, confusion, or facial palsy    Psychiatric: No suicidal ideation, depression, anxiety, insomnia, memory loss, paranoia, mood swings, agitation, hallucinations, or hyperactivity    Hematology: No gum bleeding, nose bleeding, or skin lumps    Lymphatic: No enlarged or tender lymph nodes. No extremity swelling    Endocrine: gestational diabetes, on metformin, reports FS <100; postprandial 120-150; No heat or cold intolerance    Immunologic: No recurrent or persistent infections

## 2021-07-09 NOTE — OB PST NOTE - PMH
Insulin controlled gestational diabetes mellitus (GDM) in third trimester    Maternal care for low transverse scar from previous  delivery

## 2021-07-09 NOTE — OB PST NOTE - HISTORY OF PRESENT ILLNESS
35 y.o. female  presents to PST for evaluation scheduled for repeat , pt reports gestational diabetes with current pregnancy, on Metformin, denies other complications, reports good fetal movements  2019  @ 37 weeks for nonprogressing labor

## 2021-07-09 NOTE — OB PST NOTE - NSANTHOSAYNRD_GEN_A_CORE
No. TANYA screening performed.  STOP BANG Legend: 0-2 = LOW Risk; 3-4 = INTERMEDIATE Risk; 5-8 = HIGH Risk

## 2021-07-09 NOTE — OB PST NOTE - ASSESSMENT
35 y.o. female with preop diagnosis of maternal care for low transverse scar from previous  delivery

## 2021-07-09 NOTE — OB PST NOTE - NSHPPHYSICALEXAM_GEN_ALL_CORE
Constitutional: Well Developed, Well Groomed, Well Nourished, No Distress    Eyes: PERRL, EOMI, conjunctiva clear    Ears: Normal    Mouth & Gums: Normal, moist    Pharynx: No tenderness, discharge, or peritonsillar abscess    Tonsils: No Redness, discharge, tenderness, or swelling    Neck: Supple, no JVD    Breast: Normal shape    Back: Normal shape, ROM intact, strength intact, no vertebral tenderness    Respiratory: Airway patent, breath sounds equal, good air movement, respiration non-labored, clear to auscultation bilateral    Cardiovascular:  Regular rate and rhythm, no rubs or murmur    Gastrointestinal: gravid abdomen    Extremities: No clubbing, cyanosis, or pedal edema    Vascular:  Radial Pulse normal, DP pulse normal    Neurological: alert & oriented x 3, sensation intact, deep reflexes intact, cranial nerve intact, normal strength    Skin: warm and dry, normal color    Lymph Nodes: normal posterior cervical lymph node, normal anterior cervical lymph node, normal supraclavicular lymph node    Musculoskeletal: ROM intact, no joint swelling, warmth, or calf tenderness. Normal strength    Psychiatric: normal affect, normal behavior

## 2021-07-10 ENCOUNTER — TRANSCRIPTION ENCOUNTER (OUTPATIENT)
Age: 36
End: 2021-07-10

## 2021-07-10 ENCOUNTER — APPOINTMENT (OUTPATIENT)
Dept: DISASTER EMERGENCY | Facility: CLINIC | Age: 36
End: 2021-07-10

## 2021-07-11 ENCOUNTER — INPATIENT (INPATIENT)
Facility: HOSPITAL | Age: 36
LOS: 2 days | Discharge: HOME | End: 2021-07-14
Attending: OBSTETRICS & GYNECOLOGY | Admitting: OBSTETRICS & GYNECOLOGY
Payer: MEDICAID

## 2021-07-11 ENCOUNTER — TRANSCRIPTION ENCOUNTER (OUTPATIENT)
Age: 36
End: 2021-07-11

## 2021-07-11 VITALS — TEMPERATURE: 98 F

## 2021-07-11 DIAGNOSIS — Z98.891 HISTORY OF UTERINE SCAR FROM PREVIOUS SURGERY: Chronic | ICD-10-CM

## 2021-07-11 DIAGNOSIS — Z3A.00 WEEKS OF GESTATION OF PREGNANCY NOT SPECIFIED: ICD-10-CM

## 2021-07-11 DIAGNOSIS — O26.899 OTHER SPECIFIED PREGNANCY RELATED CONDITIONS, UNSPECIFIED TRIMESTER: ICD-10-CM

## 2021-07-11 DIAGNOSIS — Z98.890 OTHER SPECIFIED POSTPROCEDURAL STATES: Chronic | ICD-10-CM

## 2021-07-11 PROBLEM — O34.211 MATERNAL CARE FOR LOW TRANSVERSE SCAR FROM PREVIOUS CESAREAN DELIVERY: Chronic | Status: ACTIVE | Noted: 2021-07-09

## 2021-07-11 LAB
BASOPHILS # BLD AUTO: 0.03 K/UL — SIGNIFICANT CHANGE UP (ref 0–0.2)
BASOPHILS NFR BLD AUTO: 0.3 % — SIGNIFICANT CHANGE UP (ref 0–2)
BLD GP AB SCN SERPL QL: NEGATIVE — SIGNIFICANT CHANGE UP
COVID-19 SPIKE DOMAIN AB INTERP: NEGATIVE — SIGNIFICANT CHANGE UP
COVID-19 SPIKE DOMAIN ANTIBODY RESULT: 0.4 U/ML — SIGNIFICANT CHANGE UP
EOSINOPHIL # BLD AUTO: 0.08 K/UL — SIGNIFICANT CHANGE UP (ref 0–0.5)
EOSINOPHIL NFR BLD AUTO: 0.8 % — SIGNIFICANT CHANGE UP (ref 0–6)
HCT VFR BLD CALC: 42.5 % — SIGNIFICANT CHANGE UP (ref 34.5–45)
HGB BLD-MCNC: 13.5 G/DL — SIGNIFICANT CHANGE UP (ref 11.5–15.5)
IANC: 7.24 K/UL — SIGNIFICANT CHANGE UP (ref 1.5–8.5)
IMM GRANULOCYTES NFR BLD AUTO: 0.3 % — SIGNIFICANT CHANGE UP (ref 0–1.5)
LYMPHOCYTES # BLD AUTO: 2.38 K/UL — SIGNIFICANT CHANGE UP (ref 1–3.3)
LYMPHOCYTES # BLD AUTO: 22.3 % — SIGNIFICANT CHANGE UP (ref 13–44)
MCHC RBC-ENTMCNC: 28.6 PG — SIGNIFICANT CHANGE UP (ref 27–34)
MCHC RBC-ENTMCNC: 31.8 GM/DL — LOW (ref 32–36)
MCV RBC AUTO: 90 FL — SIGNIFICANT CHANGE UP (ref 80–100)
MONOCYTES # BLD AUTO: 0.9 K/UL — SIGNIFICANT CHANGE UP (ref 0–0.9)
MONOCYTES NFR BLD AUTO: 8.4 % — SIGNIFICANT CHANGE UP (ref 2–14)
NEUTROPHILS # BLD AUTO: 7.24 K/UL — SIGNIFICANT CHANGE UP (ref 1.8–7.4)
NEUTROPHILS NFR BLD AUTO: 67.9 % — SIGNIFICANT CHANGE UP (ref 43–77)
NRBC # BLD: 0 /100 WBCS — SIGNIFICANT CHANGE UP
NRBC # FLD: 0 K/UL — SIGNIFICANT CHANGE UP
PLATELET # BLD AUTO: 246 K/UL — SIGNIFICANT CHANGE UP (ref 150–400)
RBC # BLD: 4.72 M/UL — SIGNIFICANT CHANGE UP (ref 3.8–5.2)
RBC # FLD: 14.9 % — HIGH (ref 10.3–14.5)
RH IG SCN BLD-IMP: POSITIVE — SIGNIFICANT CHANGE UP
SARS-COV-2 IGG+IGM SERPL QL IA: 0.4 U/ML — SIGNIFICANT CHANGE UP
SARS-COV-2 IGG+IGM SERPL QL IA: NEGATIVE — SIGNIFICANT CHANGE UP
SARS-COV-2 N GENE NPH QL NAA+PROBE: NOT DETECTED
SARS-COV-2 RNA SPEC QL NAA+PROBE: SIGNIFICANT CHANGE UP
WBC # BLD: 10.66 K/UL — HIGH (ref 3.8–10.5)
WBC # FLD AUTO: 10.66 K/UL — HIGH (ref 3.8–10.5)

## 2021-07-11 PROCEDURE — 93010 ELECTROCARDIOGRAM REPORT: CPT

## 2021-07-11 PROCEDURE — 59510 CESAREAN DELIVERY: CPT | Mod: U9

## 2021-07-11 RX ORDER — DIPHENHYDRAMINE HCL 50 MG
25 CAPSULE ORAL EVERY 6 HOURS
Refills: 0 | Status: DISCONTINUED | OUTPATIENT
Start: 2021-07-11 | End: 2021-07-14

## 2021-07-11 RX ORDER — DEXAMETHASONE 0.5 MG/5ML
4 ELIXIR ORAL EVERY 6 HOURS
Refills: 0 | Status: DISCONTINUED | OUTPATIENT
Start: 2021-07-11 | End: 2021-07-12

## 2021-07-11 RX ORDER — KETOROLAC TROMETHAMINE 30 MG/ML
30 SYRINGE (ML) INJECTION EVERY 6 HOURS
Refills: 0 | Status: DISCONTINUED | OUTPATIENT
Start: 2021-07-11 | End: 2021-07-12

## 2021-07-11 RX ORDER — SIMETHICONE 80 MG/1
80 TABLET, CHEWABLE ORAL EVERY 4 HOURS
Refills: 0 | Status: DISCONTINUED | OUTPATIENT
Start: 2021-07-11 | End: 2021-07-14

## 2021-07-11 RX ORDER — HEPARIN SODIUM 5000 [USP'U]/ML
5000 INJECTION INTRAVENOUS; SUBCUTANEOUS EVERY 12 HOURS
Refills: 0 | Status: DISCONTINUED | OUTPATIENT
Start: 2021-07-11 | End: 2021-07-14

## 2021-07-11 RX ORDER — ACETAMINOPHEN 500 MG
975 TABLET ORAL
Refills: 0 | Status: DISCONTINUED | OUTPATIENT
Start: 2021-07-11 | End: 2021-07-14

## 2021-07-11 RX ORDER — FAMOTIDINE 10 MG/ML
20 INJECTION INTRAVENOUS ONCE
Refills: 0 | Status: COMPLETED | OUTPATIENT
Start: 2021-07-11 | End: 2021-07-11

## 2021-07-11 RX ORDER — IBUPROFEN 200 MG
600 TABLET ORAL EVERY 6 HOURS
Refills: 0 | Status: COMPLETED | OUTPATIENT
Start: 2021-07-11 | End: 2022-06-09

## 2021-07-11 RX ORDER — NALOXONE HYDROCHLORIDE 4 MG/.1ML
0.1 SPRAY NASAL
Refills: 0 | Status: DISCONTINUED | OUTPATIENT
Start: 2021-07-11 | End: 2021-07-12

## 2021-07-11 RX ORDER — FAMOTIDINE 10 MG/ML
20 INJECTION INTRAVENOUS ONCE
Refills: 0 | Status: DISCONTINUED | OUTPATIENT
Start: 2021-07-11 | End: 2021-07-11

## 2021-07-11 RX ORDER — ONDANSETRON 8 MG/1
4 TABLET, FILM COATED ORAL EVERY 6 HOURS
Refills: 0 | Status: DISCONTINUED | OUTPATIENT
Start: 2021-07-11 | End: 2021-07-12

## 2021-07-11 RX ORDER — SODIUM CHLORIDE 9 MG/ML
1000 INJECTION, SOLUTION INTRAVENOUS
Refills: 0 | Status: DISCONTINUED | OUTPATIENT
Start: 2021-07-11 | End: 2021-07-11

## 2021-07-11 RX ORDER — OXYCODONE HYDROCHLORIDE 5 MG/1
5 TABLET ORAL
Refills: 0 | Status: COMPLETED | OUTPATIENT
Start: 2021-07-11 | End: 2021-07-18

## 2021-07-11 RX ORDER — MORPHINE SULFATE 50 MG/1
1 CAPSULE, EXTENDED RELEASE ORAL ONCE
Refills: 0 | Status: DISCONTINUED | OUTPATIENT
Start: 2021-07-11 | End: 2021-07-12

## 2021-07-11 RX ORDER — OXYCODONE HYDROCHLORIDE 5 MG/1
5 TABLET ORAL ONCE
Refills: 0 | Status: DISCONTINUED | OUTPATIENT
Start: 2021-07-11 | End: 2021-07-14

## 2021-07-11 RX ORDER — OXYTOCIN 10 UNIT/ML
333.33 VIAL (ML) INJECTION
Qty: 20 | Refills: 0 | Status: DISCONTINUED | OUTPATIENT
Start: 2021-07-11 | End: 2021-07-11

## 2021-07-11 RX ORDER — OXYTOCIN 10 UNIT/ML
333.33 VIAL (ML) INJECTION
Qty: 20 | Refills: 0 | Status: DISCONTINUED | OUTPATIENT
Start: 2021-07-11 | End: 2021-07-14

## 2021-07-11 RX ORDER — TETANUS TOXOID, REDUCED DIPHTHERIA TOXOID AND ACELLULAR PERTUSSIS VACCINE, ADSORBED 5; 2.5; 8; 8; 2.5 [IU]/.5ML; [IU]/.5ML; UG/.5ML; UG/.5ML; UG/.5ML
0.5 SUSPENSION INTRAMUSCULAR ONCE
Refills: 0 | Status: DISCONTINUED | OUTPATIENT
Start: 2021-07-11 | End: 2021-07-14

## 2021-07-11 RX ORDER — CITRIC ACID/SODIUM CITRATE 300-500 MG
30 SOLUTION, ORAL ORAL ONCE
Refills: 0 | Status: DISCONTINUED | OUTPATIENT
Start: 2021-07-11 | End: 2021-07-11

## 2021-07-11 RX ORDER — LANOLIN
1 OINTMENT (GRAM) TOPICAL EVERY 6 HOURS
Refills: 0 | Status: DISCONTINUED | OUTPATIENT
Start: 2021-07-11 | End: 2021-07-14

## 2021-07-11 RX ORDER — MAGNESIUM HYDROXIDE 400 MG/1
30 TABLET, CHEWABLE ORAL
Refills: 0 | Status: DISCONTINUED | OUTPATIENT
Start: 2021-07-11 | End: 2021-07-14

## 2021-07-11 RX ORDER — SODIUM CHLORIDE 9 MG/ML
1000 INJECTION, SOLUTION INTRAVENOUS ONCE
Refills: 0 | Status: DISCONTINUED | OUTPATIENT
Start: 2021-07-11 | End: 2021-07-11

## 2021-07-11 RX ORDER — SODIUM CHLORIDE 9 MG/ML
1000 INJECTION, SOLUTION INTRAVENOUS
Refills: 0 | Status: DISCONTINUED | OUTPATIENT
Start: 2021-07-11 | End: 2021-07-12

## 2021-07-11 RX ADMIN — Medication 30 MILLIGRAM(S): at 09:30

## 2021-07-11 RX ADMIN — FAMOTIDINE 20 MILLIGRAM(S): 10 INJECTION INTRAVENOUS at 04:49

## 2021-07-11 RX ADMIN — Medication 30 MILLIGRAM(S): at 21:07

## 2021-07-11 RX ADMIN — Medication 30 MILLILITER(S): at 04:49

## 2021-07-11 RX ADMIN — Medication 975 MILLIGRAM(S): at 12:15

## 2021-07-11 RX ADMIN — Medication 30 MILLIGRAM(S): at 15:57

## 2021-07-11 RX ADMIN — SIMETHICONE 80 MILLIGRAM(S): 80 TABLET, CHEWABLE ORAL at 08:01

## 2021-07-11 RX ADMIN — Medication 30 MILLIGRAM(S): at 15:42

## 2021-07-11 RX ADMIN — HEPARIN SODIUM 5000 UNIT(S): 5000 INJECTION INTRAVENOUS; SUBCUTANEOUS at 12:17

## 2021-07-11 RX ADMIN — FAMOTIDINE 20 MILLIGRAM(S): 10 INJECTION INTRAVENOUS at 08:02

## 2021-07-11 RX ADMIN — Medication 975 MILLIGRAM(S): at 18:29

## 2021-07-11 RX ADMIN — SIMETHICONE 80 MILLIGRAM(S): 80 TABLET, CHEWABLE ORAL at 21:07

## 2021-07-11 RX ADMIN — Medication 30 MILLIGRAM(S): at 21:40

## 2021-07-11 RX ADMIN — Medication 30 MILLIGRAM(S): at 08:59

## 2021-07-11 RX ADMIN — Medication 1000 MILLIUNIT(S)/MIN: at 07:24

## 2021-07-11 RX ADMIN — Medication 975 MILLIGRAM(S): at 13:00

## 2021-07-11 RX ADMIN — Medication 975 MILLIGRAM(S): at 19:20

## 2021-07-11 NOTE — OB RN INTRAOPERATIVE NOTE - NSSELHIDDEN_OBGYN_ALL_OB_FT
[NS_DeliveryAttending1_OBGYN_ALL_OB_FT:[id number]],[NS_DeliveryRN_OBGYN_ALL_OB_FT:[id number]],[NS_CirculateRN2_OBGYN_ALL_OB_FT:[id number]]

## 2021-07-11 NOTE — PROVIDER CONTACT NOTE (CHANGE IN STATUS NOTIFICATION) - BACKGROUND
pt s/p repeat c/section at 39 wks, now .  QBL in .  pt fudus 2 above umbilicus.  pts abdomen appears distended.  100ml urine from [id number].  venodynes on .  incision C/D/I

## 2021-07-11 NOTE — DISCHARGE NOTE OB - PATIENT PORTAL LINK FT
You can access the FollowMyHealth Patient Portal offered by [facility] by registering at the following website: [email or web address]. By joining [employer]’s FollowMyHealth portal, you will also be able to view your health information using other applications (apps) compatible with our system.

## 2021-07-11 NOTE — OB PROVIDER TRIAGE NOTE - NSOBPROVIDERNOTE_OBGYN_ALL_OB_FT
Dr. Carreon made aware. MD to patient bedside.   Dr. Romo made aware.    @4:27am Dr. Romo at bedside    @4:34am Dr. Carreon and Dr. Romo at bedside.  Plan for repeat      huddle held with Dr. Carreon, Dr. Romo, anesthesia, and charge RN.   -Routine and rapid COVID ordered

## 2021-07-11 NOTE — OB PROVIDER DELIVERY SUMMARY - NSPROVIDERDELIVERYNOTE_OBGYN_ALL_OB_FT
IVF 2000      Viable infant, apgars 8/9, 9#11, cephalic  Adhesions from uterus to anterior abdominal wall and omentum taken down without difficulty  Bladder densely adherent to lower uterine segment  Large uterine window entered bluntly  Grossly tubes, ovaries  Hysterotomy closed in a single layer  Bladder backfilled with 250cc methylene blue--no extravasation  Fibrillar placed along hysterotomy and anterior uterine body    Due to exceptionally thin lower uterine segment, recommend avoiding labor in future pregnancies. Patient should be treated as a vertical uterine incision with delivery at 36-37w

## 2021-07-11 NOTE — OB PROVIDER H&P - HISTORY OF PRESENT ILLNESS
Pt. is a 36y/o  EGA 39wks reports of leakage of fluid since 3:45am and painful contractions. Pt. denies vaginal bleeding. Pt. reports good fetal movement. Pt. is scheduled for repeat  21. Pt. refusing to TOLAC.     AP: GDMA2  Medical Hx: Denies  Surgical Hx:    OBGYN Hx:   Primary  2019 8#0 for failure to push   Ovarian Cystectomy   Meds: Metformin PNV  NKDA

## 2021-07-11 NOTE — DISCHARGE NOTE OB - CARE PLAN
Principal Discharge DX:	 delivery delivered  Goal:	recovery  Assessment and plan of treatment:	pelvic rest, regular diet  Secondary Diagnosis:	Gestational diabetes mellitus, delivered

## 2021-07-11 NOTE — OB PROVIDER DELIVERY SUMMARY - NSSELHIDDEN_OBGYN_ALL_OB_FT
[NS_DeliveryAttending1_OBGYN_ALL_OB_FT:[id number]],[NS_DeliveryRN_OBGYN_ALL_OB_FT:[id number]],[NS_CirculateRN2_OBGYN_ALL_OB_FT:[id number]]

## 2021-07-11 NOTE — PROVIDER CONTACT NOTE (CHANGE IN STATUS NOTIFICATION) - ACTION/TREATMENT ORDERED:
MD Oro and MD Lewis at bedside  pt to be given PO Simethicone and IVP Pepcid.  will continue to monitor  EKG ordered

## 2021-07-11 NOTE — OB PROVIDER TRIAGE NOTE - NSHPPHYSICALEXAM_GEN_ALL_CORE
ICU Vital Signs Last 24 Hrs  T(C): --  T(F): --  HR: --  BP: --  BP(mean): --  ABP: --  ABP(mean): --  RR: --  SpO2: --    Abdomen soft nontender  SVE: 9.5/100/-3, grossly ruptured of thick meconium   EFW: 3600  GBS: U ICU Vital Signs Last 24 Hrs  T(C): --  T(F): --  HR: --  BP: --  BP(mean): --  ABP: --  ABP(mean): --  RR: --  SpO2: --    Abdomen soft nontender  NPO since 10pm (milk)  SVE: 9.5/100/-3, grossly ruptured of thick meconium   EFW: 3600gms by leopolds   GBS: Unknown  FHR:   Iain every 2mins

## 2021-07-11 NOTE — OB NEONATOLOGY/PEDIATRICIAN DELIVERY SUMMARY - NSPEDSNEONOTESA_OBGYN_ALL_OB_FT
Called by OB to attend a repeat C/S delivery due to meconium . Baby is  product of a 39 week gestation born to a  34 year old female. Maternal labs include Blood Type  ____ , HIV ___ , RPR_____ , Hep B[ +/- ], GBS  ___ , rest is unremarkable. Maternal history is not significant. Pregnancy was complicated by gestational DM2 on metformin. SROM at 0330 was with meconium, approximately 5 hrs. Baby was born vigorous and crying spontaneously. W/D/S/S. Bulb suctioning of the mouth and nose cleared meconium stained fluid.  Apgars were: 8/9. No maternal temperature was recorded prior to delivery due to precipitous C/S. Admit to nursery. Called by OB to attend a repeat C/S delivery due to meconium . Baby is  product of a 39 week gestation born to a  34 year old female. Maternal labs include Blood Type O+, HIV-, RPR NR, Hep B[- ], GBS  ___ , rest is unremarkable. Maternal history is not significant. Pregnancy was complicated by gestational DM2 on metformin. SROM at 0330 was with meconium, approximately 5 hrs. Baby was born vigorous and crying spontaneously. W/D/S/S. Bulb suctioning of the mouth and nose cleared meconium stained fluid.  Apgars were: 8/9. No maternal temperature was recorded prior to delivery due to precipitous C/S. Admit to nursery. Called by OB to attend a repeat C/S delivery due to meconium . Baby is  product of a 39 week gestation born to a  34 year old female. Maternal labs include Blood Type O+, HIV-, RPR NR, Hep B[- ], GBS unknown , rest is unremarkable. Maternal history is not significant. Pregnancy was complicated by gestational DM2 on metformin. SROM at 0330 was with meconium, approximately 5 hrs. Baby was born vigorous and crying spontaneously. W/D/S/S. Bulb suctioning of the mouth and nose cleared meconium stained fluid.  Apgars were: 8/9. No maternal temperature was recorded prior to delivery due to precipitous C/S. Admit to nursery.

## 2021-07-11 NOTE — DISCHARGE NOTE OB - MEDICATION SUMMARY - MEDICATIONS TO TAKE
I will START or STAY ON the medications listed below when I get home from the hospital:    ibuprofen 600 mg oral tablet  -- 1 tab(s) by mouth every 6 hours, As Needed  -- Indication: For  delivery delivered    acetaminophen 325 mg oral tablet  -- 3 tab(s) by mouth , As Needed  -- Indication: For  delivery delivered

## 2021-07-11 NOTE — OB RN DELIVERY SUMMARY - NSSELHIDDEN_OBGYN_ALL_OB_FT
[NS_DeliveryAttending1_OBGYN_ALL_OB_FT:[id number]],[NS_DeliveryRN_OBGYN_ALL_OB_FT:[id number]],[NS_CirculateRN2_OBGYN_ALL_OB_FT:[id number]]

## 2021-07-11 NOTE — OB PROVIDER H&P - NSHPPHYSICALEXAM_GEN_ALL_CORE
ICU Vital Signs Last 24 Hrs  T(C): --  T(F): --  HR: --  BP: --  BP(mean): --  ABP: --  ABP(mean): --  RR: --  SpO2: --    Abdomen soft nontender  NPO since 10pm (milk)  SVE: 9.5/100/-3, grossly ruptured of thick meconium   EFW: 3600gms by leopolds   GBS: Unknown  FHR: 145bpm, moderate variability, no accels, early decels   Iain every 2mins

## 2021-07-11 NOTE — OB PROVIDER H&P - ASSESSMENT
Evidence of labor, Dr. Carreon made aware. MD to patient bedside.   Dr. Romo made aware.    @4:27am Dr. Romo at bedside    @4:34am Dr. Carreon and Dr. Romo at bedside.  Plan for repeat      huddle held with Dr. Carreon, Dr. Romo, anesthesia, and charge RN.   -Routine and rapid COVID ordered

## 2021-07-11 NOTE — OB PROVIDER TRIAGE NOTE - HISTORY OF PRESENT ILLNESS
Pt. is a 34y/o  EGA 39wks reports of leakage of fluid since 3:45am and painful contractions. Pt. denies vaginal bleeding. Pt. reports good fetal movement. Pt. is scheduled for repeat  21. Pt. refusing to TOLAC.     AP: GDMA2  Medical Hx: Denies  Surgical Hx:    OBGYN Hx:   Primary  2019 8#0 for failure to push   Ovarian Cystectomy   Meds: Metformin PNV  NKDA

## 2021-07-11 NOTE — OB NEONATOLOGY/PEDIATRICIAN DELIVERY SUMMARY - APGAR COMPLETED BY
Síndrome Viral [Viral Syndrome, Child]  Un virus es la causa más común de enfermedad entre los niños. Puede ocasionar diferentes síntomas, según cuál sea la parte del cuerpo afectada. Si el virus se aloja en la nariz, la garganta o los pulmones, puede provocar tos, congestión y, en ocasiones, dolor de felicia. Si se aloja en el estómago o el tracto intestinal, puede provocar vómito y diarrea. Hay veces en que puede causar síntomas vagos, tales be dolor generalizado, nerviosismo, pérdida de apetito, dificultades para dormir y mucho llanto. También es posible que aparezca reji erupción cutánea (salpullido) leve los primeros jey y que desaparezca después.  Reji enfermedad viral suele durar entre reji y dos semanas; yunier vez un poco más. Para tratar reji enfermedad viral solo se necesitan unos cuantos cuidados domésticos. Los antibióticos no ayudan. En algunos casos, reji infección bacteriana más grave puede verse be un síndrome viral katerin los primeros días de la enfermedad. Es importante que preste atención a los signos que se describen a continuación.  Cuidados en la casa  Siga estas pautas para cuidar de caldwell hijo en caldwell casa:  · Líquidos. La fiebre aumenta la pérdida de agua del cuerpo. Si el bebé tiene menos de un año de edad, siga dándole caldwell alimentación habitual (fórmula o el pecho). Entre comida y comida, chari reji solución de rehidratación oral, que puede comprar en tiendas de comestibles y farmacias sin receta. Si caldwell hijo tiene más de un año, chari abundante cantidad de líquidos, be agua, jugo, ginger-hema, limonada, bebidas frutales o helados de jugo.  · Comida. Si caldwell hijo no quiere comer alimentos sólidos, está dylon katerin algunos días, siempre y cuando mirian gran cantidad de líquidos. Si a caldwell hijo le diagnosticaron reji enfermedad renal, pregunte al médico de caldwell hijo cuánto y qué tipos de líquidos debería beber el margarito para prevenir la deshidratación. Si caldwell hijo tiene reji enfermedad renal, beber demasiada  "cantidad de líquidos puede hacer que se acumule en el cuerpo, lo que puede ser peligroso para la brian del margarito.  · Actividad. Los niños con fiebre deben quedarse en casa, descansando o jugando tranquilamente. Anime al margarito a que vivienne siestas frecuentes. Caldwell hijo puede regresar a la guardería o a la escuela reji vez que la fiebre haya desaparecido y esté comiendo dylon y sintiéndose mejor.  · Sueño. Es común que el margarito tenga períodos de irritabilidad y falta de sueño. Un margarito congestionado dormirá mejor con la felicia y la parte superior del cuerpo recostadas sobre almohadas, o si se levanta la cabecera de la cama sobre un bloque de 6 pulgadas (15 cm).   · Tos. La tos es parte normal de esta enfermedad. Puede resultar útil colocar un humidificador de charo fría junto a la cama. No se ha comprobado que los medicamentos de venta sin receta ("OTC" por larissa siglas en inglés) para la tos y el resfriado den mejores resultados que un jarabe thomas que no contenga medicamento. Bev, por otro lado, estos medicamentos pueden provocar efectos secundarios graves, especialmente, en niños menores de dos años. No les dé medicamentos de venta sin receta para la tos y el resfriado a niños menores de seis años a menos que caldwell médico le haya indicado específicamente hacerlo. Además, no exponga a caldwell hijo al humo del cigarrillo. Eso puede agravar la tos.  · Congestión nasal. Succione la nariz del bebé con reji jeringa con punta de goma. Puede colocar dos o elisa gotas de agua salada (solución salina) en cada orificio de la nariz antes de succionar para ayudar a eliminar las secreciones. Puede comprar las gotas cotter para la nariz sin receta. También puede preparar la solución agregando 1/4 de cucharadita de sal de paul en 1 taza de agua.  · Fiebre. Puede darle a caldwell hijo acetaminofén o ibuprofeno para controlar la fiebre y el dolor, a menos que le hayan recetado otro medicamento. Si caldwell hijo tiene reji enfermedad hepática o renal crónica, o ha " "tenido alguna vez reji úlcera estomacal o sangrado gastrointestinal, consulte con caldwell médico antes de darle estos medicamentos. No use aspirina en un margarito trini de 18 años que esté enfermo con fiebre porque puede provocarle graves daños en el hígado.  · Prevención. Lávese las durga después de tocar al margarito enfermo para ayudar a evitar que usted y larissa otros hijos se contagien esta enfermedad viral.  Visitas de control  Antonietta el seguimiento con el proveedor de atención médica de caldwell hijo, según le hayan indicado.  Cuándo buscar atención médica  Obtenga atención médica de inmediato para caldwell hijo si algo de lo siguiente ocurre:  · Fiebre de 100.4ºF (38ºC) oral o 101.4ºF (38.5ºC) rectal, o superior, que no disminuye con medicamentos para la fiebre.  · Respiración rápida. Si el bebé es recién nacido o tiene hasta seis semanas, eso es más de 60 respiraciones por minuto; si el margarito tiene entre seis semanas y dos años, equivale a más de 45 respiraciones por minuto; si el margarito tiene entre elisa y seis años, equivale a más de 35 respiraciones por minuto; si el margarito tiene entre siete y flora años de edad, equivale a más, equivale a más de 30 respiraciones por minuto; y, si el margarito tiene más de flora años, equivale a más de 25 respiraciones por minuto.  · Dificultad para respirar o silbidos.  · Dolor de oídos o de los senos paranasales; dolor o rigidez en el zhanna, o dolor de felicia.  · Dolor abdominal que va en aumento o dolor que no se ksenia al cabo de ocho horas.  · Diarrea o vómito persistentes.  · Nerviosismo inusual, somnolencia o confusión, debilidad o mareo.  · Aparición de reji nueva erupción cutánea (salpullido).  · Ausencia de lágrimas al llorar, ojos "hundidos" o boca seca.  · No ha mojado un pañal en ocho horas si es bebé u orina menos que lo normal si es un margarito mayor.  · Ardor al orinar.  · Convulsiones.     Date Last Reviewed: 9/25/2015  © 9128-5350 [employer]. [address] [place] [address], [place] [address]. " Todos los derechos reservados. Esta información no pretende sustituir la atención médica profesional. Sólo caldwell médico puede diagnosticar y tratar un problema de brian.         Salomon Yi/Pediatrician

## 2021-07-11 NOTE — DISCHARGE NOTE OB - MATERIALS PROVIDED
Vaccinations/[facility]  Screening Program/  Immunization Record/Breastfeeding Log/Guide to Postpartum Care/[facility] Hearing Screen Program/Shaken Baby Prevention Handout/Breastfeeding Guide and Packet

## 2021-07-11 NOTE — DISCHARGE NOTE OB - CARE PROVIDER_API CALL
Fermin Estrada)  MaternalFetal Medicine; Obstetrics and Gynecology  [address] [place] [address]  Phone: [phone]  Fax: [phone]  Follow Up Time:

## 2021-07-12 LAB
BASOPHILS # BLD AUTO: 0 K/UL — SIGNIFICANT CHANGE UP (ref 0–0.2)
BASOPHILS NFR BLD AUTO: 0 % — SIGNIFICANT CHANGE UP (ref 0–2)
EOSINOPHIL # BLD AUTO: 0 K/UL — SIGNIFICANT CHANGE UP (ref 0–0.5)
EOSINOPHIL NFR BLD AUTO: 0 % — SIGNIFICANT CHANGE UP (ref 0–6)
HCT VFR BLD CALC: 34.6 % — SIGNIFICANT CHANGE UP (ref 34.5–45)
HGB BLD-MCNC: 10.8 G/DL — LOW (ref 11.5–15.5)
IANC: 11.79 K/UL — HIGH (ref 1.5–8.5)
LYMPHOCYTES # BLD AUTO: 0.7 K/UL — LOW (ref 1–3.3)
LYMPHOCYTES # BLD AUTO: 5.2 % — LOW (ref 13–44)
MCHC RBC-ENTMCNC: 28.5 PG — SIGNIFICANT CHANGE UP (ref 27–34)
MCHC RBC-ENTMCNC: 31.2 GM/DL — LOW (ref 32–36)
MCV RBC AUTO: 91.3 FL — SIGNIFICANT CHANGE UP (ref 80–100)
MONOCYTES # BLD AUTO: 0.35 K/UL — SIGNIFICANT CHANGE UP (ref 0–0.9)
MONOCYTES NFR BLD AUTO: 2.6 % — SIGNIFICANT CHANGE UP (ref 2–14)
NEUTROPHILS # BLD AUTO: 12.43 K/UL — HIGH (ref 1.8–7.4)
NEUTROPHILS NFR BLD AUTO: 89.6 % — HIGH (ref 43–77)
PLATELET # BLD AUTO: 206 K/UL — SIGNIFICANT CHANGE UP (ref 150–400)
RBC # BLD: 3.79 M/UL — LOW (ref 3.8–5.2)
RBC # FLD: 14.9 % — HIGH (ref 10.3–14.5)
T PALLIDUM AB TITR SER: NEGATIVE — SIGNIFICANT CHANGE UP
WBC # BLD: 13.48 K/UL — HIGH (ref 3.8–10.5)
WBC # FLD AUTO: 13.48 K/UL — HIGH (ref 3.8–10.5)

## 2021-07-12 RX ORDER — OXYCODONE HYDROCHLORIDE 5 MG/1
5 TABLET ORAL
Refills: 0 | Status: DISCONTINUED | OUTPATIENT
Start: 2021-07-12 | End: 2021-07-14

## 2021-07-12 RX ORDER — SENNA PLUS 8.6 MG/1
1 TABLET ORAL
Refills: 0 | Status: DISCONTINUED | OUTPATIENT
Start: 2021-07-12 | End: 2021-07-14

## 2021-07-12 RX ORDER — IBUPROFEN 200 MG
600 TABLET ORAL EVERY 6 HOURS
Refills: 0 | Status: DISCONTINUED | OUTPATIENT
Start: 2021-07-12 | End: 2021-07-14

## 2021-07-12 RX ORDER — FERROUS SULFATE 325(65) MG
325 TABLET ORAL DAILY
Refills: 0 | Status: DISCONTINUED | OUTPATIENT
Start: 2021-07-12 | End: 2021-07-14

## 2021-07-12 RX ADMIN — Medication 600 MILLIGRAM(S): at 02:05

## 2021-07-12 RX ADMIN — Medication 600 MILLIGRAM(S): at 08:21

## 2021-07-12 RX ADMIN — SIMETHICONE 80 MILLIGRAM(S): 80 TABLET, CHEWABLE ORAL at 15:25

## 2021-07-12 RX ADMIN — SIMETHICONE 80 MILLIGRAM(S): 80 TABLET, CHEWABLE ORAL at 02:05

## 2021-07-12 RX ADMIN — Medication 975 MILLIGRAM(S): at 19:02

## 2021-07-12 RX ADMIN — Medication 975 MILLIGRAM(S): at 13:03

## 2021-07-12 RX ADMIN — Medication 600 MILLIGRAM(S): at 15:21

## 2021-07-12 RX ADMIN — HEPARIN SODIUM 5000 UNIT(S): 5000 INJECTION INTRAVENOUS; SUBCUTANEOUS at 12:03

## 2021-07-12 RX ADMIN — HEPARIN SODIUM 5000 UNIT(S): 5000 INJECTION INTRAVENOUS; SUBCUTANEOUS at 00:13

## 2021-07-12 RX ADMIN — Medication 600 MILLIGRAM(S): at 16:21

## 2021-07-12 RX ADMIN — Medication 600 MILLIGRAM(S): at 09:21

## 2021-07-12 RX ADMIN — Medication 600 MILLIGRAM(S): at 21:15

## 2021-07-12 RX ADMIN — SIMETHICONE 80 MILLIGRAM(S): 80 TABLET, CHEWABLE ORAL at 20:30

## 2021-07-12 RX ADMIN — Medication 975 MILLIGRAM(S): at 06:15

## 2021-07-12 RX ADMIN — Medication 975 MILLIGRAM(S): at 12:03

## 2021-07-12 RX ADMIN — Medication 975 MILLIGRAM(S): at 01:00

## 2021-07-12 RX ADMIN — Medication 975 MILLIGRAM(S): at 18:02

## 2021-07-12 RX ADMIN — Medication 600 MILLIGRAM(S): at 02:40

## 2021-07-12 RX ADMIN — Medication 975 MILLIGRAM(S): at 05:33

## 2021-07-12 RX ADMIN — MAGNESIUM HYDROXIDE 30 MILLILITER(S): 400 TABLET, CHEWABLE ORAL at 15:25

## 2021-07-12 RX ADMIN — Medication 975 MILLIGRAM(S): at 00:13

## 2021-07-12 RX ADMIN — Medication 600 MILLIGRAM(S): at 20:30

## 2021-07-12 RX ADMIN — MAGNESIUM HYDROXIDE 30 MILLILITER(S): 400 TABLET, CHEWABLE ORAL at 02:05

## 2021-07-12 NOTE — PROGRESS NOTE ADULT - ASSESSMENT
A/P: 34yo POD#1 s/p rLTCS as patient did not desire TOLAC. Pregnancy c/b GDMA2.  Patient is stable and doing well post-operatively.    - Continue regular diet.  - Increase ambulation.  - Continue motrin, tylenol, oxycodone PRN for pain control.  -F/u FS for 24 hours  - F/u AM CBC    Tarah Bowman MD PGY1

## 2021-07-13 ENCOUNTER — APPOINTMENT (OUTPATIENT)
Dept: ANTEPARTUM | Facility: CLINIC | Age: 36
End: 2021-07-13

## 2021-07-13 RX ORDER — IBUPROFEN 200 MG
1 TABLET ORAL
Qty: 0 | Refills: 0 | DISCHARGE
Start: 2021-07-13

## 2021-07-13 RX ORDER — METFORMIN HYDROCHLORIDE 850 MG/1
0 TABLET ORAL
Qty: 0 | Refills: 0 | DISCHARGE

## 2021-07-13 RX ORDER — ACETAMINOPHEN 500 MG
3 TABLET ORAL
Qty: 0 | Refills: 0 | DISCHARGE
Start: 2021-07-13

## 2021-07-13 RX ADMIN — SIMETHICONE 80 MILLIGRAM(S): 80 TABLET, CHEWABLE ORAL at 15:45

## 2021-07-13 RX ADMIN — HEPARIN SODIUM 5000 UNIT(S): 5000 INJECTION INTRAVENOUS; SUBCUTANEOUS at 01:02

## 2021-07-13 RX ADMIN — Medication 600 MILLIGRAM(S): at 05:00

## 2021-07-13 RX ADMIN — HEPARIN SODIUM 5000 UNIT(S): 5000 INJECTION INTRAVENOUS; SUBCUTANEOUS at 11:58

## 2021-07-13 RX ADMIN — Medication 600 MILLIGRAM(S): at 12:25

## 2021-07-13 RX ADMIN — OXYCODONE HYDROCHLORIDE 5 MILLIGRAM(S): 5 TABLET ORAL at 01:45

## 2021-07-13 RX ADMIN — HEPARIN SODIUM 5000 UNIT(S): 5000 INJECTION INTRAVENOUS; SUBCUTANEOUS at 23:02

## 2021-07-13 RX ADMIN — Medication 1 TABLET(S): at 18:39

## 2021-07-13 RX ADMIN — Medication 975 MILLIGRAM(S): at 01:45

## 2021-07-13 RX ADMIN — Medication 600 MILLIGRAM(S): at 18:30

## 2021-07-13 RX ADMIN — Medication 600 MILLIGRAM(S): at 23:02

## 2021-07-13 RX ADMIN — Medication 975 MILLIGRAM(S): at 15:45

## 2021-07-13 RX ADMIN — Medication 600 MILLIGRAM(S): at 05:45

## 2021-07-13 RX ADMIN — Medication 600 MILLIGRAM(S): at 11:58

## 2021-07-13 RX ADMIN — Medication 325 MILLIGRAM(S): at 11:58

## 2021-07-13 RX ADMIN — MAGNESIUM HYDROXIDE 30 MILLILITER(S): 400 TABLET, CHEWABLE ORAL at 01:02

## 2021-07-13 RX ADMIN — OXYCODONE HYDROCHLORIDE 5 MILLIGRAM(S): 5 TABLET ORAL at 01:02

## 2021-07-13 RX ADMIN — SIMETHICONE 80 MILLIGRAM(S): 80 TABLET, CHEWABLE ORAL at 01:03

## 2021-07-13 RX ADMIN — SIMETHICONE 80 MILLIGRAM(S): 80 TABLET, CHEWABLE ORAL at 05:00

## 2021-07-13 RX ADMIN — Medication 975 MILLIGRAM(S): at 01:03

## 2021-07-13 RX ADMIN — SIMETHICONE 80 MILLIGRAM(S): 80 TABLET, CHEWABLE ORAL at 23:02

## 2021-07-13 RX ADMIN — Medication 975 MILLIGRAM(S): at 16:15

## 2021-07-13 RX ADMIN — Medication 600 MILLIGRAM(S): at 17:59

## 2021-07-13 NOTE — PROGRESS NOTE ADULT - ASSESSMENT
PE:  Lung sounds clear bilaterally. Normal heart rhythm.   Breasts: soft, nontender  Nipples: intact  Abdomen: Soft, slightly distended and nontender. Tympanic abdomen.  Bowel sounds present. Fundus firm  Abdominal incision: Clean, dry and intact with dermabond.   Vaginal: Lochia light rubra  Extremities: Slight edema noted bilaterally and equal to lower extremities, nontender with no erythremic areas noted. Positive pedal pulses. No palpable veins noted  Other relevant physical exam findings: none          Plan  A/P: 35y      Day#2    repeat post-operative  section delivery. QBL 380cc. GDMA2 was on metformin. Stable          Problem#1:  section delivery  Continue routine post-operative and postpartum care  Increase ambulation, analgesia PRN and pain medication protocol of standing ibuprofen and acetaminophen and oxycodone prn  Encouraged to wear abdominal binder for support and to use incentive spirometer  Discussed breast/nipple care and breastfeeding.  Discussed abdominal incision care  Discussed discharge planning.       Problem#2: GDMA2 was on metformin  Glucose testing needed in 6weeks. PE:  Lung sounds clear bilaterally. Normal heart rhythm.   Breasts: soft, nontender  Nipples: intact  Abdomen: Soft, slightly distended and nontender. Tympanic abdomen.  Bowel sounds present. Fundus firm  Abdominal incision: Clean, dry and intact with dermabond.   Vaginal: Lochia light rubra  Extremities: Slight edema noted bilaterally and equal to lower extremities, nontender with no erythremic areas noted. Positive pedal pulses. No palpable veins noted  Other relevant physical exam findings: none          Plan  A/P: 35y      Day#2    repeat post-operative  section delivery. QBL 387cc. GDMA2 was on metformin. Stable          Problem#1:  section delivery  Continue routine post-operative and postpartum care  Increase ambulation, analgesia PRN and pain medication protocol of standing ibuprofen and acetaminophen and oxycodone prn  Encouraged to wear abdominal binder for support and to use incentive spirometer  Discussed breast/nipple care and breastfeeding.  Discussed abdominal incision care  Discussed discharge planning.       Problem#2: GDMA2 was on metformin  Glucose testing needed in 6weeks.

## 2021-07-13 NOTE — PROGRESS NOTE ADULT - ATTENDING COMMENTS
Pt seen and examined and agree with above  Denies abdominal pain that she attributed to gas that resolved   D/c planning Poor (<50%)

## 2021-07-14 VITALS
DIASTOLIC BLOOD PRESSURE: 64 MMHG | OXYGEN SATURATION: 99 % | SYSTOLIC BLOOD PRESSURE: 106 MMHG | HEART RATE: 87 BPM | RESPIRATION RATE: 19 BRPM | TEMPERATURE: 97 F

## 2021-07-14 RX ADMIN — Medication 975 MILLIGRAM(S): at 09:30

## 2021-07-14 RX ADMIN — SIMETHICONE 80 MILLIGRAM(S): 80 TABLET, CHEWABLE ORAL at 05:29

## 2021-07-14 RX ADMIN — Medication 600 MILLIGRAM(S): at 05:29

## 2021-07-14 RX ADMIN — Medication 600 MILLIGRAM(S): at 06:15

## 2021-07-14 RX ADMIN — Medication 600 MILLIGRAM(S): at 00:00

## 2021-07-14 RX ADMIN — Medication 975 MILLIGRAM(S): at 10:00

## 2021-07-14 NOTE — PROGRESS NOTE ADULT - SUBJECTIVE AND OBJECTIVE BOX
Day ___1 of Anesthesia Pain Management Service    SUBJECTIVE:  Pain Scale Score	At rest: __none_ 	With Activity: __mild_ 	[x ] Refer to charted pain scores    THERAPY:    s/p ____0.1_ mg PF morphine     OBJECTIVE:    Sedation Score:	[ x] Alert	[ ] Drowsy	[ ] Arousable	[ ] Asleep	[ ] Unresponsive    Side Effects:	[x ] None	[ ] Nausea	[ ] Vomiting	[ ] Pruritus  		  [ ] Weakness		[ ] Numbness	[ ] Other:    Vital Signs Last 24 Hrs  T(C): 36.8 (12 Jul 2021 06:26), Max: 37.3 (11 Jul 2021 16:05)  T(F): 98.2 (12 Jul 2021 06:26), Max: 99.2 (11 Jul 2021 21:43)  HR: 71 (12 Jul 2021 06:26) (71 - 102)  BP: 95/58 (12 Jul 2021 06:26) (94/50 - 122/86)    RR: 18 (12 Jul 2021 06:26) (18 - 18)  SpO2: 100% (12 Jul 2021 06:26) (98% - 100%)    ASSESSMENT/ PLAN  [x ] Patient transitioned to prn analgesics  [ x] Pain management per primary service, pain service to sign off   [ x]Documentation and Verification of current medications     Comments: No anesthetic complications.
OB Progress Note:  Delivery, POD#1    S: 34yo POD#1 s/p rLTCS as patient did not desire a TOLAC. Pregnancy c/b GDMA2. Her pain is well controlled. She is tolerating a regular diet but is not yet passing flatus. Denies N/V. Denies CP/SOB/lightheadedness/dizziness.   She is ambulating without difficulty.   Voiding spontaneously.     O:   Vital Signs Last 24 Hrs  T(C): 37.3 (2021 21:43), Max: 37.3 (2021 16:05)  T(F): 99.2 (2021 21:43), Max: 99.2 (2021 21:43)  HR: 85 (2021 21:43) (73 - 122)  BP: 98/58 (2021 21:43) (87/59 - 129/81)  BP(mean): 78 (2021 10:00) (64 - 89)  RR: 18 (2021 21:43) (16 - 24)  SpO2: 100% (2021 21:43) (98% - 100%)    PE:  General: NAD  Abdomen: Mildly distended, appropriately tender, incision c/d/i.  Extremities: No erythema, no pitting edema    Labs:  Blood type: O Positive  Rubella IgG: RPR: Negative                          13.5   10.66<H> >-----------< 246    (  @ 05:02 )             42.5                        13.2   8.28 >-----------< 237    (  @ 12:47 )             42.2    - @ 12:47      136  |  104  |  8   ----------------------------<  71  4.2   |  17<L>  |  0.64        Ca    9.1      2021 12:47                
Postop Day  __1_ s/p   C- Section    THERAPY:  [ x ] Spinal morphine     [  ] Epidural morphine   [  ] IV PCA Hydromorphone 1 mg/ml      Sedation Score:	  [ x ] Alert	    [  ] Drowsy        [  ] Arousable	[  ] Asleep	[  ] Unresponsive    Side Effects:	  [ x ] None	     [  ] Nausea        [  ] Pruritus        [  ] Weakness   [  ] Numbness        ASSESSMENT/ PLAN   [   ] Discontinue         [  ] Continue  [ x ]Documentation and Verification of current medications     Comments: Transitioned to prn oral analgesics by primary team. No anesthetic complication.
S: 36yo POD#3 s/p repeat LTCS. OBHx: gdm on metformin.  The patient feels well.  Pain is well controlled. She is tolerating a regular diet and passing flatus. She is voiding spontaneously, and ambulating without difficulty. Denies CP/SOB. Denies lightheadedness/dizziness. Denies N/V.    O:  Vitals:  Vital Signs Last 24 Hrs  T(C): 36.9 (14 Jul 2021 06:06), Max: 37.2 (13 Jul 2021 13:51)  T(F): 98.4 (14 Jul 2021 06:06), Max: 99 (13 Jul 2021 13:51)  HR: 85 (14 Jul 2021 06:06) (85 - 92)  BP: 102/61 (14 Jul 2021 06:06) (97/51 - 102/61)  BP(mean): --  RR: 18 (14 Jul 2021 06:06) (18 - 18)  SpO2: 100% (14 Jul 2021 06:06) (99% - 100%)    MEDICATIONS  (STANDING):  acetaminophen   Tablet .. 975 milliGRAM(s) Oral <User Schedule>  diphtheria/tetanus/pertussis (acellular) Vaccine (ADAcel) 0.5 milliLiter(s) IntraMuscular once  ferrous    sulfate 325 milliGRAM(s) Oral daily  heparin   Injectable 5000 Unit(s) SubCutaneous every 12 hours  ibuprofen  Tablet. 600 milliGRAM(s) Oral every 6 hours  prenatal multivitamin 1 Tablet(s) Oral daily    MEDICATIONS  (PRN):  diphenhydrAMINE 25 milliGRAM(s) Oral every 6 hours PRN Pruritus  lanolin Ointment 1 Application(s) Topical every 6 hours PRN Sore Nipples  magnesium hydroxide Suspension 30 milliLiter(s) Oral two times a day PRN Constipation  oxyCODONE    IR 5 milliGRAM(s) Oral once PRN Moderate to Severe Pain (4-10)  oxyCODONE    IR 5 milliGRAM(s) Oral every 3 hours PRN Moderate to Severe Pain (4-10)  senna 1 Tablet(s) Oral two times a day PRN Constipation  simethicone 80 milliGRAM(s) Chew every 4 hours PRN Gas      LABS:  Blood type: O Positive  Rubella IgG: RPR: Negative                          10.8<L>   13.48<H> >-----------< 206    ( 07-12 @ 08:05 )             34.6      Physical exam:  Gen: NAD  Abdomen: Soft, nontender, no distension , firm uterine fundus at umbilicus.  Incision: Clean, dry, and intact with dermabond  Pelvic: Normal lochia noted  Ext: +1 edema, No calf tenderness    A/P: 36yo POD#3 s/p repeat LTCS.  Patient is stable and is doing well post-operatively.  - Continue motrin, tylenol, oxycodone PRN for pain control.  - Increase ambulation  - Continue regular diet  - GTT in 6 weeks for history of gdm  - Discharge planning    Rajani LEE        
Patient assessed at 0646.  Subjective  35y      POD#2    repeat post-operative  section delivery  Medical/Surgical/OB/GYN History of HPI:  AP: GDMA2 was on metformin  Medical Hx: Denies  Surgical Hx:  , GDMA2 was on insulin  OBGYN Hx:   Primary  2019 8#0 for failure to push   Ovarian Cystectomy   Meds: Metformin PNV  NKDA  (2021 04:58)  Patient denies any pain at the time of assessment. Pain being managed well by pain management protocol. Patient denies any headache, blur vision, dizziness and/or weakness/fatigue. Out of bed ambulating passing flatus, negative bowel movement denies any urge and/or nausea/vomiting, voiding without difficulties. Tolerating a regular diet. Patient breastfeeding without difficulties.       Vitals  T(C): 37 (21 @ 06:08), Max: 37 (21 @ 22:04)  HR: 104 (21 @ 06:08) (71 - 104)  BP: 101/66 (21 @ 06:08) (95/56 - 111/65)  RR: 19 (21 @ 06:08) (17 - 19)  SpO2: 99% (21 @ 06:08) (97% - 100%)  Wt(kg): --                 LABS:               10.8   13.48 )-----------( 206      ( 2021 08:05 )             34.6       Blood Type: O Positive    RPR: Negative          MEDICATIONS  (STANDING):  acetaminophen   Tablet .. 975 milliGRAM(s) Oral <User Schedule>  diphtheria/tetanus/pertussis (acellular) Vaccine (ADAcel) 0.5 milliLiter(s) IntraMuscular once  ferrous    sulfate 325 milliGRAM(s) Oral daily  heparin   Injectable 5000 Unit(s) SubCutaneous every 12 hours  ibuprofen  Tablet. 600 milliGRAM(s) Oral every 6 hours  oxytocin Infusion 333.333 milliUNIT(s)/Min (1000 mL/Hr) IV Continuous <Continuous>  prenatal multivitamin 1 Tablet(s) Oral daily    MEDICATIONS  (PRN):  diphenhydrAMINE 25 milliGRAM(s) Oral every 6 hours PRN Pruritus  lanolin Ointment 1 Application(s) Topical every 6 hours PRN Sore Nipples  magnesium hydroxide Suspension 30 milliLiter(s) Oral two times a day PRN Constipation  oxyCODONE    IR 5 milliGRAM(s) Oral once PRN Moderate to Severe Pain (4-10)  oxyCODONE    IR 5 milliGRAM(s) Oral every 3 hours PRN Moderate to Severe Pain (4-10)  senna 1 Tablet(s) Oral two times a day PRN Constipation  simethicone 80 milliGRAM(s) Chew every 4 hours PRN Gas

## 2021-07-29 ENCOUNTER — APPOINTMENT (OUTPATIENT)
Dept: OBGYN | Facility: CLINIC | Age: 36
End: 2021-07-29
Payer: MEDICAID

## 2021-07-29 DIAGNOSIS — Z78.9 OTHER SPECIFIED HEALTH STATUS: ICD-10-CM

## 2021-07-29 DIAGNOSIS — Z86.32 PERSONAL HISTORY OF GESTATIONAL DIABETES: ICD-10-CM

## 2021-07-29 DIAGNOSIS — Z01.419 ENCOUNTER FOR GYNECOLOGICAL EXAMINATION (GENERAL) (ROUTINE) W/OUT ABNORMAL FINDINGS: ICD-10-CM

## 2021-07-29 DIAGNOSIS — Z34.90 ENCOUNTER FOR SUPERVISION OF NORMAL PREGNANCY, UNSPECIFIED, UNSPECIFIED TRIMESTER: ICD-10-CM

## 2021-07-29 DIAGNOSIS — N91.1 SECONDARY AMENORRHEA: ICD-10-CM

## 2021-07-29 DIAGNOSIS — Z87.42 PERSONAL HISTORY OF OTHER DISEASES OF THE FEMALE GENITAL TRACT: ICD-10-CM

## 2021-07-29 PROCEDURE — 0503F POSTPARTUM CARE VISIT: CPT

## 2021-08-26 ENCOUNTER — APPOINTMENT (OUTPATIENT)
Dept: OBGYN | Facility: CLINIC | Age: 36
End: 2021-08-26

## 2021-08-26 DIAGNOSIS — O24.410 GESTATIONAL DIABETES MELLITUS IN PREGNANCY, DIET CONTROLLED: ICD-10-CM

## 2022-10-28 NOTE — ED PROVIDER NOTE - RECENT EXPOSURE TO
Patient : Belia Waite Age: 86 year old Sex: female   MRN: [id number] Encounter Date: 10/28/2022      History   CHIEF COMPLAINT    Chief Complaint   Patient presents with   • Dizziness       HPI    HPI  Belia Waite is a 86 year old female with a PMH of CAD, hypertension presents emergency department with dizziness.  States that she has been intermittently dizzy for the last 2 to 3 days.  It is gotten worse today.  It is worse with activity movement.  It is a room spinning sensation.  Denies any visual changes.  Denies any numbness tingling weakness.  She is having some chest pressure today.  She normally returns from high blood pressure to low blood pressure and varies dramatically.  Denies any shortness of breath.  Denies any leg edema.  Denies any fevers or chills or recent illness        ALLERGIES:  No Known Allergies    Prior to Admission Medications    AZELASTINE (ASTELIN) 0.1 % NASAL SPRAY    Spray 2 sprays in each nostril 2 times daily.    DILTIAZEM (CARDIZEM CD) 240 MG 24 HR CAPSULE    Take 240 mg by mouth nightly.    DORZOLAMIDE (TRUSOPT) 2 % OPHTHALMIC SOLUTION    Place 1 drop into both eyes 2 times daily.    DUPILUMAB (DUPIXENT) 300 MG/2ML INJECTION    Inject 300 mg into the skin every 14 days.    FAMOTIDINE (PEPCID) 20 MG TABLET    Take 20 mg by mouth as needed.    FLUTICASONE FUROATE (ARNUITY ELLIPTA) 200 MCG/ACT INHALER    Inhale 1 puff into the lungs daily.    HYDROCHLOROTHIAZIDE (MICROZIDE) 12.5 MG CAPSULE    Take 12.5 mg by mouth daily.    LATANOPROSTENE (VYZULTA) 0.024 % OPTHALMIC SOLUTION    Place 2 drops into both eyes as needed.    LOSARTAN (COZAAR) 100 MG TABLET    Take 100 mg by mouth daily.    MOMETASONE (ASMANEX) 220 MCG/ACT INHALER    Inhale 1 puff into the lungs every evening.    NAPROXEN SODIUM (ALEVE) 220 MG TABLET    Take 220 mg by mouth 2 times daily as needed.       New Prescriptions    No medications on file       Past Medical History:   Diagnosis Date   • Asthma    • Essential  (primary) hypertension    • OA (osteoarthritis)    • Vertigo        Past Surgical History:   Procedure Laterality Date   •  section, low transverse         No family history on file.    Social History     Tobacco Use   • Smoking status: Never Smoker   • Smokeless tobacco: Never Used   Substance Use Topics   • Alcohol use: Not Currently   • Drug use: Never       Review of Systems   Constitutional: Negative.    HENT: Negative.    Eyes: Negative.    Respiratory: Negative.    Cardiovascular: Negative.    Gastrointestinal: Negative.    Genitourinary: Negative.    Musculoskeletal: Negative.    Skin: Negative.    Neurological: Positive for dizziness. Negative for tingling, tremors, sensory change, speech change, focal weakness, seizures, loss of consciousness, weakness and headaches.   Psychiatric/Behavioral: Negative.    All other systems reviewed and are negative.      Physical Exam     Vitals:    10/28/22 1500 10/28/22 1600 10/28/22 1615 10/28/22 1718   BP: (!) 168/87 (!) 161/88 (!) 166/78 (!) 188/102   BP Location:   LUE - Left upper extremity    Patient Position:   Semi-Choe's    Pulse: 72 94 87 94   Resp: 17 18 15    Temp:   97.8 °F (36.6 °C)    TempSrc:   Oral    SpO2:   99%    Weight:       Height:           Physical Exam  Vitals and nursing note reviewed.   Constitutional:       Appearance: Normal appearance.   HENT:      Head: Normocephalic and atraumatic.      Right Ear: Tympanic membrane normal.      Left Ear: Tympanic membrane normal.      Nose: Nose normal.      Mouth/Throat:      Mouth: Mucous membranes are moist.      Pharynx: Oropharynx is clear.      Neck: Neck supple.   Eyes:      Extraocular Movements: Extraocular movements intact.      Conjunctiva/sclera: Conjunctivae normal.      Pupils: Pupils are equal, round, and reactive to light.   Cardiovascular:      Rate and Rhythm: Normal rate and regular rhythm.      Pulses: Normal pulses.      Heart sounds: Normal heart sounds.   Pulmonary:       Effort: Pulmonary effort is normal.      Breath sounds: Normal breath sounds.   Abdominal:      General: Bowel sounds are normal.      Palpations: Abdomen is soft.      Tenderness: There is no abdominal tenderness.   Musculoskeletal:         General: Normal range of motion.   Skin:     General: Skin is warm.      Capillary Refill: Capillary refill takes less than 2 seconds.   Neurological:      General: No focal deficit present.      Mental Status: She is alert and oriented to person, place, and time.      Cranial Nerves: No cranial nerve deficit.      Sensory: No sensory deficit.      Motor: No weakness.      Coordination: Coordination normal.      Gait: Gait normal.      Deep Tendon Reflexes: Reflexes normal.   Psychiatric:         Mood and Affect: Mood normal.         Final EKG interpretation by ED physician    EKG Interpretation  Rate: 66  Rhythm: normal sinus rhythm   Sinus rhythm with a first-degree AV block        RADIOLOGY    CT HEAD WO CONTRAST   Final Result   1. No acute intracranial abnormality. No bleed or shift.   2. Atrophy.      Electronically Signed by: SANDI KOEHLER M.D.    Signed on: 10/28/2022 3:44 PM          XR CHEST PA OR AP 1 VIEW   Final Result   Poorly defined right basilar opacity may represent atelectasis.    PA and lateral views of the chest recommended for further clarification.      Electronically Signed by: SANDI KOEHLER M.D.    Signed on: 10/28/2022 2:13 PM                LABS    Results for orders placed or performed during the hospital encounter of 10/28/22   Comprehensive Metabolic Panel   Result Value    Fasting Status     Sodium 139    Potassium 3.4    Chloride 107    Carbon Dioxide 23    Anion Gap 12    Glucose 106 (H)    BUN 20    Creatinine 0.90    Glomerular Filtration Rate 63     Comment: eGFR results = or >60 mL/min/1.73m2 = Normal kidney function. Estimated GFR calculated using the CKD-EPI-R (2021) equation that does not include race in the creatinine calculation.     BUN/ Creatinine Ratio 22    Calcium 9.1    Bilirubin, Total 0.5    GOT/AST 20    GPT/ALT 23    Alkaline Phosphatase 70    Albumin 3.6    Protein, Total 7.1    Globulin 3.5    A/G Ratio 1.0   TROPONIN I, HIGH SENSITIVITY   Result Value    Troponin I, High Sensitivity 9   CBC with Automated Differential (performable only)   Result Value    WBC 8.0    RBC 4.23    HGB 13.5    HCT 39.5    MCV 93.4    MCH 31.9    MCHC 34.2    RDW-CV 13.9    RDW-SD 47.6        NRBC 0    Neutrophil, Percent 64    Lymphocytes, Percent 25    Mono, Percent 8    Eosinophils, Percent 2    Basophils, Percent 1    Immature Granulocytes 0    Absolute Neutrophils 5.1    Absolute Lymphocytes 2.0    Absolute Monocytes 0.6    Absolute Eosinophils  0.1    Absolute Basophils 0.1    Absolute Immmature Granulocytes 0.0   Magnesium   Result Value    Magnesium 2.2   Urinalysis & Reflex Microscopy With Culture If Indicated   Result Value    COLOR, URINALYSIS Straw    APPEARANCE, URINALYSIS Clear    GLUCOSE, URINALYSIS Negative    BILIRUBIN, URINALYSIS Negative    KETONES, URINALYSIS Trace (A)    SPECIFIC GRAVITY, URINALYSIS 1.008    OCCULT BLOOD, URINALYSIS Negative    PH, URINALYSIS 7.0    PROTEIN, URINALYSIS Negative    UROBILINOGEN, URINALYSIS 0.2    NITRITE, URINALYSIS Negative    LEUKOCYTE ESTERASE, URINALYSIS Negative    SQUAMOUS EPITHELIAL, URINALYSIS 1 to 5    ERYTHROCYTES, URINALYSIS 1 to 2    LEUKOCYTES, URINALYSIS 1 to 5    BACTERIA, URINALYSIS None Seen    HYALINE CASTS, URINALYSIS None Seen    MUCUS Present   TROPONIN I, HIGH SENSITIVITY   Result Value    Troponin I, High Sensitivity 13   Electrocardiogram 12-Lead   Result Value    Ventricular Rate EKG/Min (BPM) 66    Atrial Rate (BPM) 66    NC-Interval (MSEC) 212    QRS-Interval (MSEC) 98    QT-Interval (MSEC) 434    QTc 455    P Axis (Degrees) 14    R Axis (Degrees) -6    T Axis (Degrees) 38    REPORT TEXT      Sinus rhythm  with 1st degree AV block  Loss of R wave  in III and  aVF  Cannot rule out  old   Inferior infarct  Suggest Clinical Correlation  Otherwise unremarkable  No previous ECGs available  Confirmed by BELINDA MARROQUIN MD ([id number]) on 10/28/2022 6:32:47 PM     Rapid SARS-CoV-2 by PCR    Specimen: Nasal, Mid-turbinate; Swab   Result Value    Rapid SARS-COV-2 by PCR Not Detected    Isolation Guidelines      Comment: Do not use this test result as the sole decision-maker for discontinuation of isolation.   Clinical evaluation should be considered for other respiratory illness requiring transmission-based isolation.    -    No fever (<99.0 F/37.2 C) for at least 24 hours without the use of fever-reducing medications    AND  -    Respiratory symptoms have improved or resolved (e.g. cough, shortness of breath)     AND  -    COVID-19 negative test    See COVID-19 Deisolation Resource Guide    Procedural Comment      Comment: SARS-CoV-2 nucleic acid has not been detected.     Testing was performed using the [employer] Xpert RT-PCR assay that has been given Emergency Use Authorization (EUA) by the [place] Food and Drug Administration (FDA). These results are considered definitive and do not need to be confirmed by another method.         Procedures  Procedures    Medications   ondansetron (ZOFRAN) injection 4 mg (0 mg Intravenous Held 10/28/22 1441)   sodium chloride (NORMAL SALINE) 0.9 % bolus 500 mL (0 mLs Intravenous Completed 10/28/22 1510)   hydrALAZINE (APRESOLINE) injection 5 mg (5 mg Intravenous Given 10/28/22 1441)   meclizine (ANTIVERT) tablet 25 mg (25 mg Oral Given 10/28/22 1441)       [unfilled]    [unfilled]    Vitals:    10/28/22 1500 10/28/22 1600 10/28/22 1615 10/28/22 1718   BP: (!) 168/87 (!) 161/88 (!) 166/78 (!) 188/102   BP Location:   LUE - Left upper extremity    Patient Position:   Semi-Choe's    Pulse: 72 94 87 94   Resp: 17 18 15    Temp:   97.8 °F (36.6 °C)    TempSrc:   Oral    SpO2:   99%    Weight:       Height:            ED Course- MDM   none known     Rationale:  Multiple differential diagnoses were considered. The patient / caregivers were apprised of diagnostic / treatment options including alternate modes of care, in addition to the risks and benefits, for this medical condition. Based on this discussion the patient / caregiver agrees with this chosen diagnostic and treatment plan.    ED Course as of 10/28/22 1905   Fri Oct 28, 2022   1719 Kaley hickman from  agreed with plan for admission.  [JJ]      ED Course User Index  [JJ] HERBERT Jacob           Present emergency department with 2 to 3 days of dizziness that was intermittent.  Got acutely worse and it was room spinning sensation.  She also reported some chest pressure with these episodes.  EKG was nonacute.  Patient's blood pressure was a little elevated.  Denies any headache.  Patient's CT and labs are unremarkable.  At this time patient still dizzy with positional changes otherwise she is neurologically intact.  NIH is 0.  We will admit patient for further evaluation    Impression:  The primary encounter diagnosis was Chest pain, unspecified type. A diagnosis of Dizziness was also pertinent to this visit.    Follow Up:  No follow-up provider specified.     New Prescriptions    No medications on file               The patient was seen independently by the NP, with physician supervision available as necessitated.     Supervision provided by Dr. Bettencourt  Signed:  Tucker Hayes NP   10/28/2022  7:05 PM     HERBERT Jacob  10/28/22 1905

## 2023-02-07 NOTE — OB RN PATIENT PROFILE - PRO VDRL INFANT
DORITA Arreaga Case Management  [phone]         negative

## 2023-05-10 NOTE — OB RN TRIAGE NOTE - NS_FETALMOVEMENT_OBGYN_ALL_OB
Present, unchanged Expected Date Of Service: 05/10/2023 Performing Laboratory: 0 Bill For Surgical Tray: no Billing Type: Third-Party Bill

## 2024-03-22 NOTE — OB RN PATIENT PROFILE - WEIGHT: TOTAL WEIGHT IN KG
Patient requesting to be seen by Pulmonary in the AM.      Unclear to the etiology of patient's history of severe pulmonary HTN.    Will obtain an echo.
18

## 2024-05-17 NOTE — OB RN PATIENT PROFILE - INSTRUCTED TO PATIENT: IF THE INFANT IS NOT PINK DURING SKIN TO SKIN, THE PARENTS IS TO SEEK ASSISTANCE IMMEDIATELY.
Patient contacted at the request of Dr Damon. Patient unable to talk on the phone but daughter Genesis states that they would like to see Dr Damon as scheduled on 05-20-24 because they were not sure if they would stay with [place] or not. She states she would like to establish care locally with Dr Damon either way. Genesis also states that patient has been dealing with constipation and Dr Jameel Costa has been managing but it is getting worse. She states she will bring patient in to ED over the weekend if anything changes with patient status. Dr Damon made aware.  
[facility] records and pathology records needed for that visit. 
Statement Selected

## 2024-05-30 NOTE — OB PROVIDER TRIAGE NOTE - HISTORY OF PRESENT ILLNESS
33 y.o.  AMANDA 19 @ 35.4 weeks presents with c/o constant abdominal pain, LOF since 330a and generalized itching x 3 weeks. Pt denies VB. States +FM.
0

## 2024-09-12 NOTE — DISCHARGE NOTE OB - SECONDARY DIAGNOSIS.
Patient reports neck pain that starts from back to right side and radiates to right side of head, sometimes feel like a pulsating pain. Patient reports started yesterday, gets worse when laying down.    Gestational diabetes mellitus, delivered
